# Patient Record
Sex: MALE | Race: WHITE | NOT HISPANIC OR LATINO | Employment: OTHER
[De-identification: names, ages, dates, MRNs, and addresses within clinical notes are randomized per-mention and may not be internally consistent; named-entity substitution may affect disease eponyms.]

---

## 2017-01-01 ENCOUNTER — HOSPITAL (OUTPATIENT)
Dept: OTHER | Age: 82
End: 2017-01-01

## 2017-02-01 ENCOUNTER — HOSPITAL (OUTPATIENT)
Dept: OTHER | Age: 82
End: 2017-02-01

## 2017-02-13 ENCOUNTER — PRIOR ORIGINAL RECORDS (OUTPATIENT)
Dept: OTHER | Age: 82
End: 2017-02-13

## 2017-03-01 ENCOUNTER — HOSPITAL (OUTPATIENT)
Dept: OTHER | Age: 82
End: 2017-03-01

## 2017-03-21 PROBLEM — N40.0 BENIGN NON-NODULAR PROSTATIC HYPERPLASIA WITHOUT LOWER URINARY TRACT SYMPTOMS: Status: ACTIVE | Noted: 2017-03-21

## 2017-03-21 PROBLEM — L03.115 CELLULITIS OF LEG, RIGHT: Status: ACTIVE | Noted: 2017-03-21

## 2017-05-18 ENCOUNTER — OFFICE VISIT (OUTPATIENT)
Dept: HEMATOLOGY/ONCOLOGY | Facility: HOSPITAL | Age: 82
End: 2017-05-18
Attending: INTERNAL MEDICINE
Payer: MEDICARE

## 2017-05-18 VITALS
TEMPERATURE: 97 F | DIASTOLIC BLOOD PRESSURE: 82 MMHG | OXYGEN SATURATION: 95 % | SYSTOLIC BLOOD PRESSURE: 139 MMHG | HEART RATE: 95 BPM | HEIGHT: 69.02 IN | RESPIRATION RATE: 20 BRPM | BODY MASS INDEX: 21.86 KG/M2 | WEIGHT: 147.63 LBS

## 2017-05-18 DIAGNOSIS — C15.5 MALIGNANT NEOPLASM OF LOWER THIRD OF ESOPHAGUS (HCC): Primary | ICD-10-CM

## 2017-05-18 PROCEDURE — 99213 OFFICE O/P EST LOW 20 MIN: CPT | Performed by: INTERNAL MEDICINE

## 2017-05-18 NOTE — PROGRESS NOTES
Pt here for follow up. Pt remains active. Pt had stomach pain and constipation over the weekend. Pt states he is better now.

## 2017-05-18 NOTE — PROGRESS NOTES
Cancer Center Progress Note    Problem List:      Patient Active Problem List:     Esophageal cancer ([facility] [address] [address]     S/P CABG (coronary artery bypass graft)     A-fib (HCC)     CAD (coronary artery disease)     Acute respiratory failure (HCC)     VT (ventricular periaortic lymph nodes that are not pathologically enlarged. .  BOWEL/MESENTERY:  There is a soft tissue nodule measuring 24 x 19 x 25 mm that is not significantly changed in overall size since the study of 9/20/13.  The stability over time suggests a benign Rfl: 0   aspirin 81 MG Oral Tab Take 81 mg by mouth daily. Disp:  Rfl:    Calcium Carbonate-Vitamin D 600-400 MG-UNIT Oral Tab Take 1 tablet by mouth daily. Disp:  Rfl:    Atorvastatin Calcium (LIPITOR) 10 MG Oral Tab Take 10 mg by mouth nightly.  Disp:  R

## 2017-05-22 ENCOUNTER — APPOINTMENT (OUTPATIENT)
Dept: HEMATOLOGY/ONCOLOGY | Facility: HOSPITAL | Age: 82
End: 2017-05-22
Attending: INTERNAL MEDICINE
Payer: MEDICARE

## 2017-06-01 ENCOUNTER — HOSPITAL (OUTPATIENT)
Dept: OTHER | Age: 82
End: 2017-06-01

## 2017-09-11 ENCOUNTER — MYAURORA ACCOUNT LINK (OUTPATIENT)
Dept: OTHER | Age: 82
End: 2017-09-11

## 2017-09-11 ENCOUNTER — PRIOR ORIGINAL RECORDS (OUTPATIENT)
Dept: OTHER | Age: 82
End: 2017-09-11

## 2018-01-17 ENCOUNTER — PRIOR ORIGINAL RECORDS (OUTPATIENT)
Dept: OTHER | Age: 83
End: 2018-01-17

## 2018-01-19 ENCOUNTER — HOSPITAL (OUTPATIENT)
Dept: OTHER | Age: 83
End: 2018-01-19
Attending: INTERNAL MEDICINE

## 2018-01-22 ENCOUNTER — DIAGNOSTIC TRANS (OUTPATIENT)
Dept: OTHER | Age: 83
End: 2018-01-22

## 2018-02-05 ENCOUNTER — HOSPITAL (OUTPATIENT)
Dept: OTHER | Age: 83
End: 2018-02-05

## 2018-03-01 ENCOUNTER — HOSPITAL (OUTPATIENT)
Dept: OTHER | Age: 83
End: 2018-03-01

## 2018-03-14 ENCOUNTER — PRIOR ORIGINAL RECORDS (OUTPATIENT)
Dept: OTHER | Age: 83
End: 2018-03-14

## 2018-03-20 LAB
CHOLESTEROL, TOTAL: 106 MG/DL
HDL CHOLESTEROL: 67 MG/DL
HEMATOCRIT: 42.6 %
HEMOGLOBIN: 14 G/DL
LDL CHOLESTEROL: 30 MG/DL
PLATELETS: 173 K/UL
RED BLOOD COUNT: 4.31 X 10-6/U
THYROID STIMULATING HORMONE: 1.15 MLU/L
THYROID STIMULATING HORMONE: 1.68 MLU/L
TRIGLYCERIDES: 47 MG/DL
VITAMIN D 25-OH: 27.86 NG/ML
VITAMIN D 25-OH: 27.86 NG/ML
WHITE BLOOD COUNT: 3.2 X 10-3/U

## 2018-04-01 ENCOUNTER — HOSPITAL (OUTPATIENT)
Dept: OTHER | Age: 83
End: 2018-04-01

## 2018-05-02 ENCOUNTER — OFFICE VISIT (OUTPATIENT)
Dept: HEMATOLOGY/ONCOLOGY | Facility: HOSPITAL | Age: 83
End: 2018-05-02
Attending: INTERNAL MEDICINE
Payer: MEDICARE

## 2018-05-02 VITALS
HEIGHT: 68 IN | HEART RATE: 79 BPM | RESPIRATION RATE: 18 BRPM | BODY MASS INDEX: 22.43 KG/M2 | SYSTOLIC BLOOD PRESSURE: 125 MMHG | DIASTOLIC BLOOD PRESSURE: 70 MMHG | TEMPERATURE: 98 F | WEIGHT: 148 LBS | OXYGEN SATURATION: 99 %

## 2018-05-02 DIAGNOSIS — C15.5 MALIGNANT NEOPLASM OF LOWER THIRD OF ESOPHAGUS (HCC): Primary | ICD-10-CM

## 2018-05-02 PROCEDURE — 99213 OFFICE O/P EST LOW 20 MIN: CPT | Performed by: INTERNAL MEDICINE

## 2018-05-02 NOTE — PROGRESS NOTES
Cancer Center Progress Note    Problem List:      Patient Active Problem List:     Esophageal cancer ([facility] [address]     S/P CABG (coronary artery bypass graft)     A-fib (HCC)     CAD (coronary artery disease)     Acute respiratory failure (HCC)     VT (ventricular nodes that are not pathologically enlarged. .  BOWEL/MESENTERY:  There is a soft tissue nodule measuring 24 x 19 x 25 mm that is not significantly changed in overall size since the study of 9/20/13.  The stability over time suggests a benign etiology such as tablet (5 mg total) by mouth daily. Disp: 90 tablet Rfl: 4   aspirin 81 MG Oral Tab Take 81 mg by mouth daily. Disp:  Rfl:    Calcium Carbonate-Vitamin D 600-400 MG-UNIT Oral Tab Take 1 tablet by mouth daily.  Disp:  Rfl:    Atorvastatin Calcium (LIPITOR) 1 gastroesophageal junction:      He had neoadjuvant radiation and chemotherapy followed by esophagectomy on 4/30/2012. He had residual disease with 4 positive nodes. The CT scan in 11/2016 showed no evidence of distant metastatic disease.     He had both u

## 2018-05-02 NOTE — PROGRESS NOTES
Patient here for yearly follow up. Patient states he feels great, working out, playing tennis, appetite is good, and sleeping well. Patient states he passed the 6 year giancarlo with no cancer reoccurrence.      Education Record    Learner:  Patient and Spouse

## 2018-05-31 ENCOUNTER — MYAURORA ACCOUNT LINK (OUTPATIENT)
Dept: OTHER | Age: 83
End: 2018-05-31

## 2018-09-10 ENCOUNTER — HOSPITAL (OUTPATIENT)
Dept: OTHER | Age: 83
End: 2018-09-10

## 2018-09-11 ENCOUNTER — PRIOR ORIGINAL RECORDS (OUTPATIENT)
Dept: OTHER | Age: 83
End: 2018-09-11

## 2018-09-26 ENCOUNTER — MYAURORA ACCOUNT LINK (OUTPATIENT)
Dept: OTHER | Age: 83
End: 2018-09-26

## 2018-09-26 ENCOUNTER — PRIOR ORIGINAL RECORDS (OUTPATIENT)
Dept: OTHER | Age: 83
End: 2018-09-26

## 2018-10-01 ENCOUNTER — HOSPITAL (OUTPATIENT)
Dept: OTHER | Age: 83
End: 2018-10-01

## 2018-10-03 ENCOUNTER — OFFICE VISIT (OUTPATIENT)
Dept: HEMATOLOGY/ONCOLOGY | Facility: HOSPITAL | Age: 83
End: 2018-10-03
Attending: INTERNAL MEDICINE
Payer: MEDICARE

## 2018-10-03 VITALS
HEART RATE: 86 BPM | WEIGHT: 148.19 LBS | SYSTOLIC BLOOD PRESSURE: 131 MMHG | OXYGEN SATURATION: 96 % | BODY MASS INDEX: 22.46 KG/M2 | TEMPERATURE: 96 F | HEIGHT: 67.99 IN | DIASTOLIC BLOOD PRESSURE: 84 MMHG | RESPIRATION RATE: 18 BRPM

## 2018-10-03 DIAGNOSIS — C15.5 MALIGNANT NEOPLASM OF LOWER THIRD OF ESOPHAGUS (HCC): Primary | ICD-10-CM

## 2018-10-03 LAB
ALBUMIN: 4.1 G/DL
ALKALINE PHOSPHATATE(ALK PHOS): 60 IU/L
BILIRUBIN TOTAL: 0.8 MG/DL
BUN: 18 MG/DL
CALCIUM: 9 MG/DL
CHLORIDE: 101 MEQ/L
CHOLESTEROL, TOTAL: 110 MG/DL
CREATININE, SERUM: 0.8 MG/DL
GLUCOSE: 104 MG/DL
HDL CHOLESTEROL: 67 MG/DL
HEMATOCRIT: 42.3 %
HEMOGLOBIN: 14.1 G/DL
LDL CHOLESTEROL: 33 MG/DL
PLATELETS: 164 K/UL
POTASSIUM, SERUM: 4.7 MEQ/L
PROTEIN, TOTAL: 6.6 G/DL
RED BLOOD COUNT: 4.27 X 10-6/U
SGOT (AST): 41 IU/L
SGPT (ALT): 40 IU/L
SODIUM: 139 MEQ/L
TRIGLYCERIDES: 49 MG/DL
VITAMIN D 25-OH: 27.86 NG/ML
WHITE BLOOD COUNT: 4.5 X 10-3/U

## 2018-10-03 PROCEDURE — 99213 OFFICE O/P EST LOW 20 MIN: CPT | Performed by: INTERNAL MEDICINE

## 2018-10-03 NOTE — PROGRESS NOTES
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 ADRENALS:  Normal.  AORTA/VASCULAR:  Mild atherosclerotic disease of the abdominal aorta and iliac vessels. [facility]   RETROPERITONEUM:  Small periaortic lymph nodes that are not pathologically enlarge Allergies    Medications:    FA-Pyridoxine-Cyanocobalamin (FOLBIC OR) Take by mouth daily. Disp:  Rfl:    FERROUS SULFATE OR once a week. Disp:  Rfl:    Cholecalciferol (VITAMIN D3) 2000 units Oral Tab Take by mouth daily.  Disp:  Rfl:    finasteride 5 MG O 05/02/2018    CA 9.3 05/02/2018    ALKPHO 67 05/02/2018    ALT 56 05/02/2018    AST 47 (H) 05/02/2018    BILT 0.9 05/02/2018    ALB 3.6 05/02/2018    TP 7.3 05/02/2018        Impression:     Adenocarcinoma of the gastroesophageal junction:      He had neoa

## 2018-10-06 NOTE — PROGRESS NOTES
Jameson Artist, Dr. Yasmany Cotton would like Mr. Nicole Goss to have a repeat EGD at this time. Please schedule this with either Dr. Ula Litten or Dr. Adam Chang at [facility].  Thank you. Britany - as above.   I want to be sure that this is scheduled as per Dr. Neil Asif recommend

## 2018-10-09 NOTE — PROGRESS NOTES
Dr. Ramila Benito- Pt actually has a f/u appt with Rachelle 10/22/18 to discuss scheduling his EGD. Pt is 80 and has not been seen since 2015. He has yet to schedule his CT that Dr. Jaki Bear has ordered.  Should we keep appt or c/x appt and just schedule the EGD at [facility]?

## 2018-10-11 ENCOUNTER — HOSPITAL ENCOUNTER (OUTPATIENT)
Dept: CT IMAGING | Facility: HOSPITAL | Age: 83
Discharge: HOME | End: 2018-10-11
Attending: INTERNAL MEDICINE
Payer: MEDICARE

## 2018-10-11 DIAGNOSIS — C15.5 MALIGNANT NEOPLASM OF LOWER THIRD OF ESOPHAGUS (HCC): ICD-10-CM

## 2018-10-11 PROCEDURE — 82565 ASSAY OF CREATININE: CPT

## 2018-10-11 PROCEDURE — 71260 CT THORAX DX C+: CPT | Performed by: INTERNAL MEDICINE

## 2018-10-19 ENCOUNTER — TELEPHONE (OUTPATIENT)
Dept: HEMATOLOGY/ONCOLOGY | Facility: HOSPITAL | Age: 83
End: 2018-10-19

## 2018-10-19 NOTE — TELEPHONE ENCOUNTER
Pt calling for CT scan results. Already had been available in [address] but pt cannot log on. He will call to set up new password. Reviewed results- no met disease.    Sent copy of scan to Dr. Kam Boudreaux at [phone] (cardiologist) per pt request.

## 2018-10-24 ENCOUNTER — MED REC SCAN ONLY (OUTPATIENT)
Dept: HEMATOLOGY/ONCOLOGY | Facility: HOSPITAL | Age: 83
End: 2018-10-24

## 2018-11-01 ENCOUNTER — HOSPITAL (OUTPATIENT)
Dept: OTHER | Age: 83
End: 2018-11-01

## 2018-12-06 RX ORDER — THIAMINE HCL 100 MG
1 TABLET ORAL 2 TIMES DAILY
COMMUNITY
End: 2019-05-08

## 2018-12-11 ENCOUNTER — HOSPITAL ENCOUNTER (OUTPATIENT)
Facility: HOSPITAL | Age: 83
Setting detail: HOSPITAL OUTPATIENT SURGERY
Discharge: HOME | End: 2018-12-11
Attending: INTERNAL MEDICINE | Admitting: INTERNAL MEDICINE
Payer: MEDICARE

## 2018-12-11 ENCOUNTER — ANESTHESIA EVENT (OUTPATIENT)
Dept: ENDOSCOPY | Facility: HOSPITAL | Age: 83
End: 2018-12-11
Payer: MEDICARE

## 2018-12-11 ENCOUNTER — ANESTHESIA (OUTPATIENT)
Dept: ENDOSCOPY | Facility: HOSPITAL | Age: 83
End: 2018-12-11
Payer: MEDICARE

## 2018-12-11 VITALS
HEIGHT: 67.75 IN | TEMPERATURE: 99 F | DIASTOLIC BLOOD PRESSURE: 87 MMHG | WEIGHT: 148 LBS | SYSTOLIC BLOOD PRESSURE: 123 MMHG | RESPIRATION RATE: 18 BRPM | BODY MASS INDEX: 22.69 KG/M2 | HEART RATE: 74 BPM | OXYGEN SATURATION: 100 %

## 2018-12-11 DIAGNOSIS — Z85.01 HISTORY OF ESOPHAGEAL CANCER: ICD-10-CM

## 2018-12-11 DIAGNOSIS — R13.10 DYSPHAGIA, UNSPECIFIED TYPE: ICD-10-CM

## 2018-12-11 DIAGNOSIS — R05.9 COUGH: ICD-10-CM

## 2018-12-11 PROCEDURE — 88305 TISSUE EXAM BY PATHOLOGIST: CPT | Performed by: INTERNAL MEDICINE

## 2018-12-11 PROCEDURE — 0DB98ZX EXCISION OF DUODENUM, VIA NATURAL OR ARTIFICIAL OPENING ENDOSCOPIC, DIAGNOSTIC: ICD-10-PCS | Performed by: INTERNAL MEDICINE

## 2018-12-11 PROCEDURE — 0DB58ZX EXCISION OF ESOPHAGUS, VIA NATURAL OR ARTIFICIAL OPENING ENDOSCOPIC, DIAGNOSTIC: ICD-10-PCS | Performed by: INTERNAL MEDICINE

## 2018-12-11 PROCEDURE — 0DB78ZX EXCISION OF STOMACH, PYLORUS, VIA NATURAL OR ARTIFICIAL OPENING ENDOSCOPIC, DIAGNOSTIC: ICD-10-PCS | Performed by: INTERNAL MEDICINE

## 2018-12-11 PROCEDURE — 88342 IMHCHEM/IMCYTCHM 1ST ANTB: CPT | Performed by: INTERNAL MEDICINE

## 2018-12-11 PROCEDURE — 88312 SPECIAL STAINS GROUP 1: CPT | Performed by: INTERNAL MEDICINE

## 2018-12-11 RX ORDER — SODIUM CHLORIDE, SODIUM LACTATE, POTASSIUM CHLORIDE, CALCIUM CHLORIDE 600; 310; 30; 20 MG/100ML; MG/100ML; MG/100ML; MG/100ML
INJECTION, SOLUTION INTRAVENOUS CONTINUOUS
Status: DISCONTINUED | OUTPATIENT
Start: 2018-12-11 | End: 2018-12-11

## 2018-12-11 NOTE — ANESTHESIA POSTPROCEDURE EVALUATION
[address] Balderrama Patient Status:  Hospital Outpatient Surgery   Age/Gender 80year old male MRN [id number]   Location [address] [place] [address]. Attending Nasir Jackson, [address] [facility] Day # 0 PCP Paige Patient, DO       Anesthesia Post-op

## 2018-12-11 NOTE — ANESTHESIA PREPROCEDURE EVALUATION
PRE-OP EVALUATION    Patient Name: Sen Clemente    Pre-op Diagnosis: History of esophageal cancer [Z85.01]  Cough [R05]  Dysphagia, unspecified type [R13.10]    Procedure(s):  ESOPHAGOGASTRODUODENOSCOPY (EGD) with biopsies    Surgeon(s) and Role:     * Medtronic dual chamber, not pacemaker dependent   • CARDIAC PACEMAKER PLACEMENT      Medtronic dual chamber, not pacemaker dependent   • CATARACT Bilateral 2016    IOL's   • COLONOSCOPY N/A 12/9/2014    Performed by Judy Perales MD at [facility] ENDOSCOPY   • ES

## 2018-12-11 NOTE — H&P
[address]   11-7 Patient Status:  Hospital Outpatient Surgery    1/10/1934 MRN [id number]   [facility] ENDOSCOPY Attending Phi Kurtz MD   Hosp Day # 0 PCP [facility], DO     CC: esophageal canc Performed by Fanta Guillen MD at [address]   • HEMORRHOIDECTOMY,INT/EXT,SIMPLE     • ORTHOPEDIC SURG (PBP) Left     hammer toe   • OTHER      surgical resection-esophageus (2012),L leg-varicose vein surgery (2006)   • OTHER      an abnormality     Numbness     Neuropathy     Benign non-nodular prostatic hyperplasia without lower urinary tract symptoms     Cellulitis of leg, right    esophageal cancer w/p esopahgectomy and gastric pull through (4/2012) who we are seeing at the request

## 2018-12-11 NOTE — OPERATIVE REPORT
Laron Guthrie Patient Status:  Hospital Outpatient Surgery    1/10/1934 MRN [id number]   [facility] END[facility] Attending Severo Posey, MD   Hosp Day # 0 PCP Kady Brown DO     PREOPERATIVE DIAGNOSIS/INDICATION: Post esophagectom

## 2018-12-13 ENCOUNTER — MYAURORA ACCOUNT LINK (OUTPATIENT)
Dept: OTHER | Age: 83
End: 2018-12-13

## 2018-12-16 NOTE — PROGRESS NOTES
Date: 2018    To: Jc Corrales  : 1/10/1934    I hope this letter finds you doing well. I am writing to inform you of the following:      The biopsies obtained at the time of your recent endoscopic procedure were benign and showed no evidence o

## 2019-01-02 ENCOUNTER — HOSPITAL (OUTPATIENT)
Dept: OTHER | Age: 84
End: 2019-01-02

## 2019-02-27 ENCOUNTER — PRIOR ORIGINAL RECORDS (OUTPATIENT)
Dept: OTHER | Age: 84
End: 2019-02-27

## 2019-02-28 VITALS
HEIGHT: 69 IN | BODY MASS INDEX: 22.22 KG/M2 | HEART RATE: 78 BPM | DIASTOLIC BLOOD PRESSURE: 64 MMHG | WEIGHT: 150 LBS | SYSTOLIC BLOOD PRESSURE: 112 MMHG

## 2019-02-28 VITALS
HEART RATE: 70 BPM | HEIGHT: 69 IN | DIASTOLIC BLOOD PRESSURE: 68 MMHG | BODY MASS INDEX: 21.92 KG/M2 | WEIGHT: 148 LBS | SYSTOLIC BLOOD PRESSURE: 116 MMHG

## 2019-02-28 VITALS
DIASTOLIC BLOOD PRESSURE: 60 MMHG | SYSTOLIC BLOOD PRESSURE: 114 MMHG | BODY MASS INDEX: 21.62 KG/M2 | HEART RATE: 60 BPM | HEIGHT: 69 IN | WEIGHT: 146 LBS

## 2019-03-01 VITALS
BODY MASS INDEX: 22.36 KG/M2 | HEART RATE: 70 BPM | WEIGHT: 151 LBS | DIASTOLIC BLOOD PRESSURE: 72 MMHG | SYSTOLIC BLOOD PRESSURE: 118 MMHG | HEIGHT: 69 IN

## 2019-03-17 RX ORDER — METOPROLOL SUCCINATE 25 MG/1
25 TABLET, EXTENDED RELEASE ORAL DAILY
COMMUNITY
Start: 2012-07-31

## 2019-03-17 RX ORDER — CALCIUM CARBONATE 500(1250)
TABLET ORAL
COMMUNITY
Start: 2017-02-13

## 2019-03-17 RX ORDER — FERROUS SULFATE 325(65) MG
325 TABLET ORAL
COMMUNITY
Start: 2017-02-13

## 2019-03-17 RX ORDER — FINASTERIDE 5 MG/1
5 TABLET, FILM COATED ORAL DAILY
COMMUNITY

## 2019-03-17 RX ORDER — CHOLECALCIFEROL (VITAMIN D3) 125 MCG
50 CAPSULE ORAL DAILY
COMMUNITY

## 2019-04-10 ENCOUNTER — APPOINTMENT (OUTPATIENT)
Dept: CARDIOLOGY | Age: 84
End: 2019-04-10
Attending: INTERNAL MEDICINE

## 2019-04-11 ENCOUNTER — TELEPHONE (OUTPATIENT)
Dept: CARDIOLOGY | Age: 84
End: 2019-04-11

## 2019-04-22 ENCOUNTER — ANCILLARY PROCEDURE (OUTPATIENT)
Dept: CARDIOLOGY | Age: 84
End: 2019-04-22
Attending: INTERNAL MEDICINE

## 2019-04-22 DIAGNOSIS — Z95.810 ICD (IMPLANTABLE CARDIOVERTER-DEFIBRILLATOR) IN PLACE: ICD-10-CM

## 2019-04-23 ENCOUNTER — TELEPHONE (OUTPATIENT)
Dept: CARDIOLOGY | Age: 84
End: 2019-04-23

## 2019-05-01 ENCOUNTER — OFFICE VISIT (OUTPATIENT)
Dept: CARDIOLOGY | Age: 84
End: 2019-05-01

## 2019-05-01 ENCOUNTER — TELEPHONE (OUTPATIENT)
Dept: CARDIOLOGY | Age: 84
End: 2019-05-01

## 2019-05-01 ENCOUNTER — ANCILLARY PROCEDURE (OUTPATIENT)
Dept: CARDIOLOGY | Age: 84
End: 2019-05-01
Attending: INTERNAL MEDICINE

## 2019-05-01 VITALS
DIASTOLIC BLOOD PRESSURE: 54 MMHG | HEART RATE: 78 BPM | HEIGHT: 69 IN | WEIGHT: 148 LBS | SYSTOLIC BLOOD PRESSURE: 100 MMHG | BODY MASS INDEX: 21.92 KG/M2

## 2019-05-01 DIAGNOSIS — Z45.018 PACEMAKER REPROGRAMMING/CHECK: ICD-10-CM

## 2019-05-01 DIAGNOSIS — I10 ESSENTIAL HYPERTENSION: Primary | ICD-10-CM

## 2019-05-01 PROCEDURE — 93283 PRGRMG EVAL IMPLANTABLE DFB: CPT | Performed by: INTERNAL MEDICINE

## 2019-05-01 PROCEDURE — 99214 OFFICE O/P EST MOD 30 MIN: CPT | Performed by: INTERNAL MEDICINE

## 2019-05-01 PROCEDURE — 93290 INTERROG DEV EVAL ICPMS IP: CPT | Performed by: INTERNAL MEDICINE

## 2019-05-08 ENCOUNTER — OFFICE VISIT (OUTPATIENT)
Dept: HEMATOLOGY/ONCOLOGY | Facility: HOSPITAL | Age: 84
End: 2019-05-08
Attending: INTERNAL MEDICINE
Payer: MEDICARE

## 2019-05-08 VITALS
SYSTOLIC BLOOD PRESSURE: 112 MMHG | RESPIRATION RATE: 20 BRPM | HEART RATE: 88 BPM | OXYGEN SATURATION: 97 % | HEIGHT: 67.76 IN | BODY MASS INDEX: 22.54 KG/M2 | WEIGHT: 147 LBS | DIASTOLIC BLOOD PRESSURE: 76 MMHG | TEMPERATURE: 96 F

## 2019-05-08 DIAGNOSIS — C15.5 MALIGNANT NEOPLASM OF LOWER THIRD OF ESOPHAGUS (HCC): Primary | ICD-10-CM

## 2019-05-08 DIAGNOSIS — R42 DISEQUILIBRIUM: ICD-10-CM

## 2019-05-08 PROCEDURE — 99213 OFFICE O/P EST LOW 20 MIN: CPT | Performed by: INTERNAL MEDICINE

## 2019-05-08 RX ORDER — ATORVASTATIN CALCIUM 20 MG/1
20 TABLET, FILM COATED ORAL NIGHTLY
COMMUNITY

## 2019-05-08 NOTE — PROGRESS NOTES
Cancer Center Progress Note    Problem List:      Patient Active Problem List:     Esophageal cancer ([facility] [address]     S/P CABG (coronary artery bypass graft)     A-fib (HCC)     CAD (coronary artery disease)     Acute respiratory failure (HCC)     VT (ventricular Rfl:    FINASTERIDE 5 MG Oral Tab TAKE ONE TABLET BY MOUTH ONCE DAILY Disp: 90 tablet Rfl: 0   FA-Pyridoxine-Cyanocobalamin (FOLBIC OR) Take by mouth daily. Disp:  Rfl:    FERROUS SULFATE OR Take 325 mg by mouth.  2-3 times per week  Disp:  Rfl:    Cholecal  (H) 05/02/2018    CA 9.3 05/02/2018    ALKPHO 67 05/02/2018    ALT 56 05/02/2018    AST 47 (H) 05/02/2018    BILT 0.9 05/02/2018    ALB 3.6 05/02/2018    TP 7.3 05/02/2018      Radiologic imaging reviewed at today's visit:    CT of Chest on 10/11/2 by esophagectomy on 4/30/2012. He had residual disease with 4 positive nodes. The CT scan in 11/2016 showed no evidence of distant metastatic disease. He had a CT of the chest and an EGD since last visit. He had no evidence of recurrent disease.  He is n

## 2019-05-24 PROCEDURE — 81003 URINALYSIS AUTO W/O SCOPE: CPT | Performed by: UROLOGY

## 2019-10-29 ENCOUNTER — ANCILLARY PROCEDURE (OUTPATIENT)
Dept: CARDIOLOGY | Age: 84
End: 2019-10-29
Attending: INTERNAL MEDICINE

## 2019-10-29 DIAGNOSIS — Z95.810 ICD (IMPLANTABLE CARDIOVERTER-DEFIBRILLATOR) IN PLACE: ICD-10-CM

## 2019-11-18 ENCOUNTER — OFFICE VISIT (OUTPATIENT)
Dept: CARDIOLOGY | Age: 84
End: 2019-11-18

## 2019-11-18 VITALS
HEIGHT: 68 IN | SYSTOLIC BLOOD PRESSURE: 104 MMHG | BODY MASS INDEX: 21.82 KG/M2 | WEIGHT: 144 LBS | HEART RATE: 74 BPM | DIASTOLIC BLOOD PRESSURE: 62 MMHG

## 2019-11-18 DIAGNOSIS — Z95.810 ICD (IMPLANTABLE CARDIOVERTER-DEFIBRILLATOR) IN PLACE: ICD-10-CM

## 2019-11-18 DIAGNOSIS — I10 ESSENTIAL HYPERTENSION: Primary | ICD-10-CM

## 2019-11-18 PROCEDURE — 99213 OFFICE O/P EST LOW 20 MIN: CPT | Performed by: INTERNAL MEDICINE

## 2019-11-18 RX ORDER — ATORVASTATIN CALCIUM 10 MG/1
10 TABLET, FILM COATED ORAL
Refills: 1 | COMMUNITY
Start: 2019-11-17

## 2019-11-18 RX ORDER — FINASTERIDE 5 MG/1
5 TABLET, FILM COATED ORAL
COMMUNITY
Start: 2019-05-24 | End: 2020-05-23

## 2019-11-18 RX ORDER — OMEPRAZOLE 40 MG/1
40 CAPSULE, DELAYED RELEASE ORAL DAILY
Refills: 1 | COMMUNITY
Start: 2019-10-04

## 2019-11-18 ASSESSMENT — PATIENT HEALTH QUESTIONNAIRE - PHQ9
2. FEELING DOWN, DEPRESSED OR HOPELESS: NOT AT ALL
1. LITTLE INTEREST OR PLEASURE IN DOING THINGS: NOT AT ALL
SUM OF ALL RESPONSES TO PHQ9 QUESTIONS 1 AND 2: 0
SUM OF ALL RESPONSES TO PHQ9 QUESTIONS 1 AND 2: 0

## 2020-01-30 ENCOUNTER — ANCILLARY ORDERS (OUTPATIENT)
Dept: CARDIOLOGY | Age: 85
End: 2020-01-30

## 2020-01-30 ENCOUNTER — ANCILLARY PROCEDURE (OUTPATIENT)
Dept: CARDIOLOGY | Age: 85
End: 2020-01-30
Attending: INTERNAL MEDICINE

## 2020-01-30 DIAGNOSIS — Z95.810 ICD (IMPLANTABLE CARDIOVERTER-DEFIBRILLATOR) IN PLACE: ICD-10-CM

## 2020-01-30 PROCEDURE — X1114 CARDIAC DEVICE HOME CHECK - REMOTE UNSCHEDULED: HCPCS | Performed by: INTERNAL MEDICINE

## 2020-04-22 ENCOUNTER — ANCILLARY PROCEDURE (OUTPATIENT)
Dept: CARDIOLOGY | Age: 85
End: 2020-04-22
Attending: INTERNAL MEDICINE

## 2020-04-22 ENCOUNTER — ANCILLARY ORDERS (OUTPATIENT)
Dept: CARDIOLOGY | Age: 85
End: 2020-04-22

## 2020-04-22 DIAGNOSIS — Z95.810 ICD (IMPLANTABLE CARDIOVERTER-DEFIBRILLATOR) IN PLACE: ICD-10-CM

## 2020-04-22 PROCEDURE — 93295 DEV INTERROG REMOTE 1/2/MLT: CPT | Performed by: INTERNAL MEDICINE

## 2020-04-22 PROCEDURE — X1114 CARDIAC DEVICE HOME CHECK - REMOTE UNSCHEDULED: HCPCS | Performed by: INTERNAL MEDICINE

## 2020-05-06 ENCOUNTER — APPOINTMENT (OUTPATIENT)
Dept: CARDIOLOGY | Age: 85
End: 2020-05-06
Attending: INTERNAL MEDICINE

## 2020-05-06 ENCOUNTER — OFFICE VISIT (OUTPATIENT)
Dept: CARDIOLOGY | Age: 85
End: 2020-05-06

## 2020-05-06 VITALS
DIASTOLIC BLOOD PRESSURE: 62 MMHG | OXYGEN SATURATION: 97 % | BODY MASS INDEX: 21.9 KG/M2 | RESPIRATION RATE: 18 BRPM | HEIGHT: 68 IN | HEART RATE: 76 BPM | SYSTOLIC BLOOD PRESSURE: 114 MMHG

## 2020-05-06 DIAGNOSIS — I10 ESSENTIAL HYPERTENSION: Primary | ICD-10-CM

## 2020-05-06 DIAGNOSIS — Z95.810 ICD (IMPLANTABLE CARDIOVERTER-DEFIBRILLATOR) IN PLACE: ICD-10-CM

## 2020-05-06 PROCEDURE — 99442 TELEPHONE E&M BY PHYSICIAN EST PT NOT ORIG PREV 7 DAYS 11-20 MIN: CPT | Performed by: INTERNAL MEDICINE

## 2020-07-30 ENCOUNTER — ANCILLARY PROCEDURE (OUTPATIENT)
Dept: CARDIOLOGY | Age: 85
End: 2020-07-30
Attending: INTERNAL MEDICINE

## 2020-07-30 ENCOUNTER — ANCILLARY ORDERS (OUTPATIENT)
Dept: CARDIOLOGY | Age: 85
End: 2020-07-30

## 2020-07-30 DIAGNOSIS — Z45.02 ENCOUNTER FOR ASSESSMENT OF IMPLANTABLE CARDIOVERTER-DEFIBRILLATOR (ICD): ICD-10-CM

## 2020-07-30 PROCEDURE — 93295 DEV INTERROG REMOTE 1/2/MLT: CPT | Performed by: INTERNAL MEDICINE

## 2020-07-30 PROCEDURE — X1114 CARDIAC DEVICE HOME CHECK - REMOTE UNSCHEDULED: HCPCS | Performed by: INTERNAL MEDICINE

## 2020-10-05 ENCOUNTER — ANCILLARY PROCEDURE (OUTPATIENT)
Dept: CARDIOLOGY | Age: 85
End: 2020-10-05
Attending: INTERNAL MEDICINE

## 2020-10-05 VITALS
HEART RATE: 86 BPM | WEIGHT: 145 LBS | SYSTOLIC BLOOD PRESSURE: 104 MMHG | DIASTOLIC BLOOD PRESSURE: 68 MMHG | BODY MASS INDEX: 22.05 KG/M2

## 2020-10-05 DIAGNOSIS — Z95.810 ICD (IMPLANTABLE CARDIOVERTER-DEFIBRILLATOR) IN PLACE: Primary | ICD-10-CM

## 2020-10-05 DIAGNOSIS — I47.20 VENTRICULAR TACHYCARDIA (CMD): ICD-10-CM

## 2020-10-05 PROCEDURE — 93290 INTERROG DEV EVAL ICPMS IP: CPT | Performed by: INTERNAL MEDICINE

## 2020-10-05 PROCEDURE — 93283 PRGRMG EVAL IMPLANTABLE DFB: CPT | Performed by: INTERNAL MEDICINE

## 2020-10-14 ENCOUNTER — OFFICE VISIT (OUTPATIENT)
Dept: HEMATOLOGY/ONCOLOGY | Facility: HOSPITAL | Age: 85
End: 2020-10-14
Attending: INTERNAL MEDICINE
Payer: MEDICARE

## 2020-10-14 VITALS
HEART RATE: 75 BPM | OXYGEN SATURATION: 97 % | RESPIRATION RATE: 16 BRPM | BODY MASS INDEX: 23 KG/M2 | WEIGHT: 151 LBS | DIASTOLIC BLOOD PRESSURE: 82 MMHG | SYSTOLIC BLOOD PRESSURE: 119 MMHG

## 2020-10-14 DIAGNOSIS — R42 DISEQUILIBRIUM: ICD-10-CM

## 2020-10-14 DIAGNOSIS — C15.5 MALIGNANT NEOPLASM OF LOWER THIRD OF ESOPHAGUS (HCC): Primary | ICD-10-CM

## 2020-10-14 PROCEDURE — 99213 OFFICE O/P EST LOW 20 MIN: CPT | Performed by: INTERNAL MEDICINE

## 2020-10-14 NOTE — PROGRESS NOTES
Cancer Center Progress Note    Problem List:      Patient Active Problem List:     Esophageal cancer ([facility] [address]     S/P CABG (coronary artery bypass graft)     A-fib (HCC)     CAD (coronary artery disease)     Acute respiratory failure (HCC)     VT (ventricular 2    •  atorvastatin 20 MG Oral Tab, Take 20 mg by mouth nightly. 3 times per weeks, Disp: , Rfl:     •  FA-Pyridoxine-Cyanocobalamin (FOLBIC OR), Take by mouth daily. , Disp: , Rfl:     •  FERROUS SULFATE OR, Take 325 mg by mouth.  2-3 times per week , Disp (H) 05/02/2018    CA 9.3 05/02/2018    ALKPHO 67 05/02/2018    ALT 56 05/02/2018    AST 47 (H) 05/02/2018    BILT 0.9 05/02/2018    ALB 3.6 05/02/2018    TP 7.3 05/02/2018      Radiologic imaging reviewed at today's visit:    CT of Chest on 10/11/2018:  FI esophagectomy on 4/30/2012. He had residual disease with 4 positive nodes. The CT scan in 11/2016 showed no evidence of distant metastatic disease. He had a CT of the chest in 10/2018 and an EGD in 12/2018.  I discussed that if he has continued or incre

## 2020-11-11 ENCOUNTER — OFFICE VISIT (OUTPATIENT)
Dept: CARDIOLOGY | Age: 85
End: 2020-11-11

## 2020-11-11 VITALS
BODY MASS INDEX: 22.88 KG/M2 | WEIGHT: 151 LBS | HEART RATE: 76 BPM | SYSTOLIC BLOOD PRESSURE: 130 MMHG | HEIGHT: 68 IN | DIASTOLIC BLOOD PRESSURE: 60 MMHG

## 2020-11-11 DIAGNOSIS — Z95.810 ICD (IMPLANTABLE CARDIOVERTER-DEFIBRILLATOR) IN PLACE: ICD-10-CM

## 2020-11-11 DIAGNOSIS — I10 ESSENTIAL HYPERTENSION: Primary | ICD-10-CM

## 2020-11-11 PROCEDURE — 99213 OFFICE O/P EST LOW 20 MIN: CPT | Performed by: INTERNAL MEDICINE

## 2020-11-11 ASSESSMENT — PATIENT HEALTH QUESTIONNAIRE - PHQ9
SUM OF ALL RESPONSES TO PHQ9 QUESTIONS 1 AND 2: 0
1. LITTLE INTEREST OR PLEASURE IN DOING THINGS: NOT AT ALL
CLINICAL INTERPRETATION OF PHQ2 SCORE: NO FURTHER SCREENING NEEDED
2. FEELING DOWN, DEPRESSED OR HOPELESS: NOT AT ALL
CLINICAL INTERPRETATION OF PHQ9 SCORE: NO FURTHER SCREENING NEEDED
SUM OF ALL RESPONSES TO PHQ9 QUESTIONS 1 AND 2: 0

## 2021-02-11 ENCOUNTER — ANCILLARY PROCEDURE (OUTPATIENT)
Dept: CARDIOLOGY | Age: 86
End: 2021-02-11
Attending: INTERNAL MEDICINE

## 2021-02-11 DIAGNOSIS — Z95.810 ICD (IMPLANTABLE CARDIOVERTER-DEFIBRILLATOR) IN PLACE: ICD-10-CM

## 2021-02-11 PROCEDURE — 93295 DEV INTERROG REMOTE 1/2/MLT: CPT | Performed by: INTERNAL MEDICINE

## 2021-03-03 DIAGNOSIS — Z23 NEED FOR VACCINATION: ICD-10-CM

## 2021-04-14 ENCOUNTER — OFFICE VISIT (OUTPATIENT)
Dept: HEMATOLOGY/ONCOLOGY | Facility: HOSPITAL | Age: 86
End: 2021-04-14
Attending: INTERNAL MEDICINE
Payer: MEDICARE

## 2021-04-14 DIAGNOSIS — C15.5 MALIGNANT NEOPLASM OF LOWER THIRD OF ESOPHAGUS (HCC): ICD-10-CM

## 2021-04-14 NOTE — PROGRESS NOTES
Cancer Center Progress Note    Problem List:      Patient Active Problem List:     Esophageal cancer ([facility] [address]     S/P CABG (coronary artery bypass graft)     A-fib (HCC)     CAD (coronary artery disease)     Acute respiratory failure (HCC)     VT (ventricular D3) 2000 units Oral Tab, Take by mouth daily. , Disp: , Rfl:   aspirin 81 MG Oral Tab, Take 81 mg by mouth daily. , Disp: , Rfl:   Calcium Carbonate-Vitamin D 600-400 MG-UNIT Oral Tab, Take 1 tablet by mouth daily. , Disp: , Rfl:   omeprazole (PRILOSEC) 20 MG subpleural nodule within the right lower lobe, unchanged since 11/22/2016. VASCULATURE:  Unremarkable. THORACIC AORTA:  Scattered atherosclerosis. Ectatic mid ascending thoracic aorta measuring 3.9 cm.   MEDIASTINUM/CHRISSIE:  Postoperative changes within th

## 2021-04-14 NOTE — PROGRESS NOTES
Outpatient Oncology Care Plan  Problem list:  knowledge deficit    Problems related to:    combined modality therapy    Interventions:  provided general teaching    Expected outcomes:  understands plan of care    Progress towards outcome:  making progress

## 2021-06-03 ENCOUNTER — ANCILLARY PROCEDURE (OUTPATIENT)
Dept: CARDIOLOGY | Age: 86
End: 2021-06-03
Attending: INTERNAL MEDICINE

## 2021-06-03 ENCOUNTER — ANCILLARY ORDERS (OUTPATIENT)
Dept: CARDIOLOGY | Age: 86
End: 2021-06-03

## 2021-06-03 DIAGNOSIS — Z95.810 ICD (IMPLANTABLE CARDIOVERTER-DEFIBRILLATOR) IN PLACE: ICD-10-CM

## 2021-06-03 PROCEDURE — X1114 CARDIAC DEVICE HOME CHECK - REMOTE UNSCHEDULED: HCPCS | Performed by: INTERNAL MEDICINE

## 2021-06-03 PROCEDURE — 93295 DEV INTERROG REMOTE 1/2/MLT: CPT | Performed by: INTERNAL MEDICINE

## 2021-10-06 ENCOUNTER — TELEPHONE (OUTPATIENT)
Dept: CARDIOLOGY | Age: 86
End: 2021-10-06

## 2022-04-13 ENCOUNTER — APPOINTMENT (OUTPATIENT)
Dept: HEMATOLOGY/ONCOLOGY | Facility: HOSPITAL | Age: 87
End: 2022-04-13
Attending: INTERNAL MEDICINE
Payer: MEDICARE

## 2022-04-27 ENCOUNTER — OFFICE VISIT (OUTPATIENT)
Dept: HEMATOLOGY/ONCOLOGY | Facility: HOSPITAL | Age: 87
End: 2022-04-27
Attending: INTERNAL MEDICINE
Payer: MEDICARE

## 2022-04-27 VITALS
OXYGEN SATURATION: 99 % | BODY MASS INDEX: 22 KG/M2 | RESPIRATION RATE: 18 BRPM | HEART RATE: 91 BPM | WEIGHT: 150 LBS | SYSTOLIC BLOOD PRESSURE: 143 MMHG | TEMPERATURE: 98 F | DIASTOLIC BLOOD PRESSURE: 75 MMHG

## 2022-04-27 DIAGNOSIS — C15.5 MALIGNANT NEOPLASM OF LOWER THIRD OF ESOPHAGUS (HCC): Primary | ICD-10-CM

## 2022-04-27 PROCEDURE — 99212 OFFICE O/P EST SF 10 MIN: CPT | Performed by: INTERNAL MEDICINE

## 2022-04-27 RX ORDER — ATORVASTATIN CALCIUM 10 MG/1
10 TABLET, FILM COATED ORAL DAILY
COMMUNITY
Start: 2021-11-21

## 2022-04-27 NOTE — PROGRESS NOTES
Patient is here for follow up for esophageal cancer. He reports that he is feeling well. His appetite is good and he does not have trouble eating. He is trying to increase his activity now that winter is done. He says that his PSA has been elevated and he is following this with Dr. Tashia Caraballo. He will see him again in August.    He denies any fever or sweats. He denies any cough or shortness of breath.      Education Record    Learner:  Patient    Disease / Diagnosis: esophageal cancer    Barriers / Limitations:  None   Comments:    Method:  Discussion   Comments:    General Topics:  Side effects and symptom management   Comments:    Outcome:  Shows understanding   Comments:

## 2022-09-10 ENCOUNTER — LAB ENCOUNTER (OUTPATIENT)
Dept: LAB | Facility: HOSPITAL | Age: 87
End: 2022-09-10
Attending: UROLOGY
Payer: MEDICARE

## 2022-09-10 ENCOUNTER — LAB ENCOUNTER (OUTPATIENT)
Dept: LAB | Facility: HOSPITAL | Age: 87
End: 2022-09-10
Attending: INTERNAL MEDICINE
Payer: MEDICARE

## 2022-09-10 DIAGNOSIS — Z13.9 SCREENING PROCEDURE: ICD-10-CM

## 2022-09-10 DIAGNOSIS — R97.20 ELEVATED PSA, LESS THAN 10 NG/ML: ICD-10-CM

## 2022-09-10 DIAGNOSIS — Z01.812 PRE-OPERATIVE LABORATORY EXAMINATION: Primary | ICD-10-CM

## 2022-09-10 LAB
ANION GAP SERPL CALC-SCNC: 3 MMOL/L (ref 0–18)
BASOPHILS # BLD AUTO: 0.03 X10(3) UL (ref 0–0.2)
BASOPHILS NFR BLD AUTO: 0.5 %
BUN BLD-MCNC: 18 MG/DL (ref 7–18)
CALCIUM BLD-MCNC: 8.9 MG/DL (ref 8.5–10.1)
CHLORIDE SERPL-SCNC: 107 MMOL/L (ref 98–112)
CO2 SERPL-SCNC: 30 MMOL/L (ref 21–32)
CREAT BLD-MCNC: 0.88 MG/DL
EOSINOPHIL # BLD AUTO: 0.07 X10(3) UL (ref 0–0.7)
EOSINOPHIL NFR BLD AUTO: 1.2 %
ERYTHROCYTE [DISTWIDTH] IN BLOOD BY AUTOMATED COUNT: 13.4 %
FASTING STATUS PATIENT QL REPORTED: NO
GFR SERPLBLD BASED ON 1.73 SQ M-ARVRAT: 83 ML/MIN/1.73M2 (ref 60–?)
GLUCOSE BLD-MCNC: 78 MG/DL (ref 70–99)
HCT VFR BLD AUTO: 44.4 %
HGB BLD-MCNC: 14.4 G/DL
IMM GRANULOCYTES # BLD AUTO: 0.06 X10(3) UL (ref 0–1)
IMM GRANULOCYTES NFR BLD: 1 %
LYMPHOCYTES # BLD AUTO: 0.88 X10(3) UL (ref 1–4)
LYMPHOCYTES NFR BLD AUTO: 15.2 %
MCH RBC QN AUTO: 32.7 PG (ref 26–34)
MCHC RBC AUTO-ENTMCNC: 32.4 G/DL (ref 31–37)
MCV RBC AUTO: 100.9 FL
MONOCYTES # BLD AUTO: 0.8 X10(3) UL (ref 0.1–1)
MONOCYTES NFR BLD AUTO: 13.8 %
NEUTROPHILS # BLD AUTO: 3.94 X10 (3) UL (ref 1.5–7.7)
NEUTROPHILS # BLD AUTO: 3.94 X10(3) UL (ref 1.5–7.7)
NEUTROPHILS NFR BLD AUTO: 68.3 %
OSMOLALITY SERPL CALC.SUM OF ELEC: 291 MOSM/KG (ref 275–295)
PLATELET # BLD AUTO: 185 10(3)UL (ref 150–450)
POTASSIUM SERPL-SCNC: 4.6 MMOL/L (ref 3.5–5.1)
RBC # BLD AUTO: 4.4 X10(6)UL
SODIUM SERPL-SCNC: 140 MMOL/L (ref 136–145)
WBC # BLD AUTO: 5.8 X10(3) UL (ref 4–11)

## 2022-09-10 PROCEDURE — 80048 BASIC METABOLIC PNL TOTAL CA: CPT

## 2022-09-10 PROCEDURE — 36415 COLL VENOUS BLD VENIPUNCTURE: CPT

## 2022-09-10 PROCEDURE — 85025 COMPLETE CBC W/AUTO DIFF WBC: CPT

## 2022-09-11 LAB — SARS-COV-2 RNA RESP QL NAA+PROBE: NOT DETECTED

## 2022-09-13 ENCOUNTER — HOSPITAL ENCOUNTER (OUTPATIENT)
Dept: INTERVENTIONAL RADIOLOGY/VASCULAR | Facility: HOSPITAL | Age: 87
Discharge: HOME | End: 2022-09-13
Attending: INTERNAL MEDICINE | Admitting: INTERNAL MEDICINE
Payer: MEDICARE

## 2022-09-13 VITALS
WEIGHT: 150 LBS | BODY MASS INDEX: 22.22 KG/M2 | HEART RATE: 69 BPM | RESPIRATION RATE: 21 BRPM | DIASTOLIC BLOOD PRESSURE: 85 MMHG | TEMPERATURE: 97 F | OXYGEN SATURATION: 95 % | SYSTOLIC BLOOD PRESSURE: 129 MMHG | HEIGHT: 69 IN

## 2022-09-13 DIAGNOSIS — Z45.02 ICD (IMPLANTABLE CARDIOVERTER-DEFIBRILLATOR) BATTERY DEPLETION: ICD-10-CM

## 2022-09-13 PROCEDURE — 33263 RMVL & RPLCMT DFB GEN 2 LEAD: CPT | Performed by: INTERNAL MEDICINE

## 2022-09-13 PROCEDURE — 99153 MOD SED SAME PHYS/QHP EA: CPT | Performed by: INTERNAL MEDICINE

## 2022-09-13 PROCEDURE — 0JPT0PZ REMOVAL OF CARDIAC RHYTHM RELATED DEVICE FROM TRUNK SUBCUTANEOUS TISSUE AND FASCIA, OPEN APPROACH: ICD-10-PCS | Performed by: INTERNAL MEDICINE

## 2022-09-13 PROCEDURE — 99152 MOD SED SAME PHYS/QHP 5/>YRS: CPT | Performed by: INTERNAL MEDICINE

## 2022-09-13 PROCEDURE — 0JH608Z INSERTION OF DEFIBRILLATOR GENERATOR INTO CHEST SUBCUTANEOUS TISSUE AND FASCIA, OPEN APPROACH: ICD-10-PCS | Performed by: INTERNAL MEDICINE

## 2022-09-13 RX ORDER — LIDOCAINE HYDROCHLORIDE 10 MG/ML
INJECTION, SOLUTION EPIDURAL; INFILTRATION; INTRACAUDAL; PERINEURAL
Status: COMPLETED
Start: 2022-09-13 | End: 2022-09-13

## 2022-09-13 RX ORDER — MIDAZOLAM HYDROCHLORIDE 1 MG/ML
INJECTION INTRAMUSCULAR; INTRAVENOUS
Status: DISCONTINUED
Start: 2022-09-13 | End: 2022-09-13 | Stop reason: WASHOUT

## 2022-09-13 RX ORDER — MIDAZOLAM HYDROCHLORIDE 1 MG/ML
INJECTION INTRAMUSCULAR; INTRAVENOUS
Status: COMPLETED
Start: 2022-09-13 | End: 2022-09-13

## 2022-09-13 RX ORDER — VANCOMYCIN HYDROCHLORIDE 1 G/20ML
INJECTION, POWDER, LYOPHILIZED, FOR SOLUTION INTRAVENOUS
Status: COMPLETED
Start: 2022-09-13 | End: 2022-09-13

## 2022-09-13 RX ORDER — CEFAZOLIN SODIUM/WATER 2 G/20 ML
2 SYRINGE (ML) INTRAVENOUS
Status: DISCONTINUED | OUTPATIENT
Start: 2022-09-13 | End: 2022-09-13 | Stop reason: HOSPADM

## 2022-09-13 RX ORDER — CHLORHEXIDINE GLUCONATE 4 G/100ML
30 SOLUTION TOPICAL
Status: DISCONTINUED | OUTPATIENT
Start: 2022-09-14 | End: 2022-09-13 | Stop reason: HOSPADM

## 2022-09-13 RX ORDER — CEFAZOLIN SODIUM/WATER 2 G/20 ML
SYRINGE (ML) INTRAVENOUS
Status: COMPLETED
Start: 2022-09-13 | End: 2022-09-13

## 2022-09-13 RX ORDER — SODIUM CHLORIDE 9 MG/ML
INJECTION, SOLUTION INTRAVENOUS
Status: DISCONTINUED | OUTPATIENT
Start: 2022-09-14 | End: 2022-09-13 | Stop reason: HOSPADM

## 2022-09-13 NOTE — PROCEDURES
OPERATION(S) PERFORMED:   1. Dual chamber ICD implant. 2. ICD generator removal.       : Dontrell Lizarraga MD  INDICATION: Device at [facility]ASE. COMPLICATIONS: None     ESTIMATED BLOOD LOSS: Minimal.    SEDATION: IV was maintained by RN. Patient was assessed by myself and the nursing staff, IV sedation (versed and fentanyl) were administered during continuous ECG, pulse oximeter, and non-invasive hemodynamic monitoring. I was present from the time of sedation being started to the end of the procedure ([id number]). METHODS: The patient was brought to the outpatient cardiac telemetry unit in a fasting and nonsedated state after providing informed consent. IV sedation was administered during continuous ECG, pulse oximeter, and noninvasive hemodynamic monitoring. After administering 1% lidocaine for local anesthesia, an incision was adjacent to the previous incision. The plane of the incision was extended to expose the old generator and leads. The generator was removed from the pocket. The leads were visually inspected, there was no evidence of lead breakdown, the leads appeared to be intact. The pocket was irrigated with antibiotic solution. Bleeding was sought for until hemostasis was achieved. The leads were connected to a pulse generator. They were coiled and placed into the pocket along with the pulse generator. The incision was closed in 2 layers using 2.0, 3.0 Vicryl. Steri-Strips were applied followed by a sterile dressing. The left arm was placed in a sling and the patient was transported to telemetry in stable condition. There were no apparent intraoperative complications. CONCLUSIONS:   1. Status post successful generator change of a  dual chamber ICD. 2. Adequate/stable RA, RV pacing and sensing thresholds.           Uche Tobin MD  [address] [place] [address]  [facility]  [phone]

## 2022-09-14 NOTE — IVS NOTE
Patient discharged post ICD gen change. VSS during recovery. Patient able to eat and drink. Dressing to left chest clean, dry and intact. AVS reviewed with patient and daughter. PIV removed. Patient discharge in wheelchair with all belongings.

## 2022-09-23 NOTE — H&P
Please see scanned office note in EMR,    Pt here for cath. LVEF has diminished. Case discussed with patient and family at bedside. Risks, benefits, alternatives discussed with patient and family. Questions answered. He agrees to proceed.       Hillary Jerez MD

## 2022-09-30 ENCOUNTER — LAB ENCOUNTER (OUTPATIENT)
Dept: LAB | Facility: HOSPITAL | Age: 87
End: 2022-09-30
Attending: NURSE PRACTITIONER
Payer: MEDICARE

## 2022-09-30 DIAGNOSIS — Z01.818 PREOP EXAMINATION: Primary | ICD-10-CM

## 2022-10-01 LAB — SARS-COV-2 RNA RESP QL NAA+PROBE: NOT DETECTED

## 2022-10-03 ENCOUNTER — HOSPITAL ENCOUNTER (OUTPATIENT)
Dept: INTERVENTIONAL RADIOLOGY/VASCULAR | Facility: HOSPITAL | Age: 87
Discharge: HOME | End: 2022-10-03
Attending: INTERNAL MEDICINE | Admitting: INTERNAL MEDICINE
Payer: MEDICARE

## 2022-10-03 VITALS
RESPIRATION RATE: 15 BRPM | OXYGEN SATURATION: 100 % | HEART RATE: 70 BPM | WEIGHT: 150 LBS | TEMPERATURE: 98 F | DIASTOLIC BLOOD PRESSURE: 62 MMHG | HEIGHT: 69 IN | BODY MASS INDEX: 22.22 KG/M2 | SYSTOLIC BLOOD PRESSURE: 114 MMHG

## 2022-10-03 DIAGNOSIS — I51.9 DECREASED LEFT VENTRICULAR FUNCTION: ICD-10-CM

## 2022-10-03 DIAGNOSIS — R93.1 ABNORMAL ECHOCARDIOGRAM: ICD-10-CM

## 2022-10-03 DIAGNOSIS — I25.10 CAD (CORONARY ARTERY DISEASE): ICD-10-CM

## 2022-10-03 LAB — NT-PROBNP SERPL-MCNC: 678 PG/ML (ref ?–450)

## 2022-10-03 PROCEDURE — B2131ZZ FLUOROSCOPY OF MULTIPLE CORONARY ARTERY BYPASS GRAFTS USING LOW OSMOLAR CONTRAST: ICD-10-PCS | Performed by: INTERNAL MEDICINE

## 2022-10-03 PROCEDURE — B2181ZZ FLUOROSCOPY OF LEFT INTERNAL MAMMARY BYPASS GRAFT USING LOW OSMOLAR CONTRAST: ICD-10-PCS | Performed by: INTERNAL MEDICINE

## 2022-10-03 PROCEDURE — 83880 ASSAY OF NATRIURETIC PEPTIDE: CPT | Performed by: INTERNAL MEDICINE

## 2022-10-03 PROCEDURE — 99152 MOD SED SAME PHYS/QHP 5/>YRS: CPT | Performed by: INTERNAL MEDICINE

## 2022-10-03 PROCEDURE — 99153 MOD SED SAME PHYS/QHP EA: CPT | Performed by: INTERNAL MEDICINE

## 2022-10-03 PROCEDURE — 93455 CORONARY ART/GRFT ANGIO S&I: CPT | Performed by: INTERNAL MEDICINE

## 2022-10-03 PROCEDURE — B2111ZZ FLUOROSCOPY OF MULTIPLE CORONARY ARTERIES USING LOW OSMOLAR CONTRAST: ICD-10-PCS | Performed by: INTERNAL MEDICINE

## 2022-10-03 RX ORDER — SODIUM CHLORIDE 9 MG/ML
INJECTION, SOLUTION INTRAVENOUS
Status: DISCONTINUED | OUTPATIENT
Start: 2022-10-04 | End: 2022-10-03 | Stop reason: HOSPADM

## 2022-10-03 RX ORDER — LIDOCAINE HYDROCHLORIDE 10 MG/ML
INJECTION, SOLUTION EPIDURAL; INFILTRATION; INTRACAUDAL; PERINEURAL
Status: COMPLETED
Start: 2022-10-03 | End: 2022-10-03

## 2022-10-03 RX ORDER — HEPARIN SODIUM 5000 [USP'U]/ML
INJECTION, SOLUTION INTRAVENOUS; SUBCUTANEOUS
Status: COMPLETED
Start: 2022-10-03 | End: 2022-10-03

## 2022-10-03 RX ORDER — MIDAZOLAM HYDROCHLORIDE 1 MG/ML
INJECTION INTRAMUSCULAR; INTRAVENOUS
Status: COMPLETED
Start: 2022-10-03 | End: 2022-10-03

## 2022-10-03 NOTE — PROCEDURES
[facility]    PATIENT'S NAME: Amy Zhu   ATTENDING PHYSICIAN: Viv Quintanilla M.D. OPERATING PHYSICIAN: Viv Quintanilla M.D. PATIENT ACCOUNT#:   [de-identified]    LOCATION:  [address]  MEDICAL RECORD #:   [id number]       YOB: 1934  ADMISSION DATE:       10/03/2022      OPERATION DATE:  10/03/2022    CARDIAC PROCEDURE TRANSCRIPTION    CARDIAC CATHETERIZATION     PREOPERATIVE DIAGNOSIS:    1. Unexplained reduction of left ventricular systolic function. 2.   History of coronary artery bypass grafting surgery. 3.   History of implantable cardioverter defibrillator placement. POSTOPERATIVE DIAGNOSIS:  Coronary artery disease. PROCEDURE PERFORMED:      SEDATION:  The patient received conscious sedation. This was monitored by myself and the catheterization lab staff. This started at 8:16 a.m. and ended at 8:59 a.m. CLINICAL HISTORY:  The patient presents today for angiography. The patient had reduction of left ventricular systolic function. He was referred for angiography. DESCRIPTION OF PROCEDURE:  After informed consent obtained, patient was prepped and draped in the usual sterile fashion. Using ultrasound, right common femoral artery was identified and entered using a micropuncture needle. A micropuncture sheath followed, followed by placement of a long 6-Yi sheath. Patient appeared to have fairly tortuous iliac arteries. Pedrito right and left coronary bypass grafting catheters were used for angiography. At the end of the case, a 6-Yi Angio-Seal was used to seal the right common femoral artery site. There were no apparent acute complications noted, and the patient tolerated the procedure well. CORONARIES:  Injection of the left main coronary artery revealed a 50% stenosis in the distal left main which extends into the proximal LAD. The proximal LAD has a calcified area of stenosis of about 70%.   LAD is calcified throughout with diffuse disease. There is a 70% stenosis in its mid to distal section which is intramyocardial.  There is a LIMA that appears to provide competitive flow distally. A diagonal branch is also noted in its midsection which has competitive flow. The circumflex coronary artery has a 90% stenosis in its proximal section. A large obtuse marginal branch is occluded. There is a smaller posterolateral branch that emanates from the circumflex. Right coronary artery is a diffusely calcified vessel which is occluded proximally. GRAFT ANGIOGRAPHY:    1. The saphenous vein graft to the obtuse marginal branch is widely patent. 2.   The saphenous vein graft to the diagonal branch is widely patent. This provides retrograde flow to the LAD as well. 3.   The saphenous vein graft to the right coronary artery is occluded. 4.   The left internal mammary artery graft is patent to the LAD. This is a somewhat smaller caliber vessel yet patent. I was unable to cannulate this directly due to tortuosity in the iliac and complicated by tortuosity in the arch. 5.   Subclavian angiography revealed widely patent subclavian. This was done with a JR4 catheter. SUMMARY:    1. Diffuse coronary artery disease. 2.   Patent internal mammary artery graft to left anterior descending artery, saphenous vein grafts to the diagonal and obtuse marginal branch. The saphenous vein graft to the right coronary artery is occluded. RECOMMENDATION:  Patient will be managed medically at this point.      Dictated By Nusrat Jane M.D.  d: 10/03/2022 09:04:28  t: 10/03/2022 12:52:40  [facility] [id number]  XW/

## 2022-10-03 NOTE — PLAN OF CARE
Patient [facility] [place] today with Dr. Tracie Ruiz. Right groin site soft, CDI, no hematoma, +1 pedal pulse. Patient denies any pain. VSS. Patient's daughter @ bedside. Patient tolerating po intake. Bedrest completed. Patient ambulated in room and to BR. Patient voided. Groin stable. Dr. Tracie Ruiz @ bedside post procedure. Discharge instructions reviewed. IV D/C'd. Patient discharged to [facility] by wheelchair with belongings. Patient's daughter is .

## 2023-04-26 ENCOUNTER — APPOINTMENT (OUTPATIENT)
Dept: CT IMAGING | Age: 88
DRG: 086 | End: 2023-04-26
Attending: STUDENT IN AN ORGANIZED HEALTH CARE EDUCATION/TRAINING PROGRAM

## 2023-04-26 ENCOUNTER — APPOINTMENT (OUTPATIENT)
Dept: CT IMAGING | Age: 88
DRG: 086 | End: 2023-04-26
Attending: EMERGENCY MEDICINE

## 2023-04-26 ENCOUNTER — HOSPITAL ENCOUNTER (EMERGENCY)
Age: 88
Discharge: ANOTHER HOSPITAL | DRG: 086 | End: 2023-04-26
Attending: STUDENT IN AN ORGANIZED HEALTH CARE EDUCATION/TRAINING PROGRAM

## 2023-04-26 ENCOUNTER — APPOINTMENT (OUTPATIENT)
Dept: ULTRASOUND IMAGING | Age: 88
DRG: 086 | End: 2023-04-26
Attending: SURGERY

## 2023-04-26 ENCOUNTER — APPOINTMENT (OUTPATIENT)
Dept: GENERAL RADIOLOGY | Age: 88
DRG: 086 | End: 2023-04-26
Attending: STUDENT IN AN ORGANIZED HEALTH CARE EDUCATION/TRAINING PROGRAM

## 2023-04-26 ENCOUNTER — APPOINTMENT (OUTPATIENT)
Dept: GENERAL RADIOLOGY | Age: 88
DRG: 086 | End: 2023-04-26
Attending: EMERGENCY MEDICINE

## 2023-04-26 ENCOUNTER — APPOINTMENT (OUTPATIENT)
Dept: CARDIOLOGY | Age: 88
DRG: 086 | End: 2023-04-26
Attending: STUDENT IN AN ORGANIZED HEALTH CARE EDUCATION/TRAINING PROGRAM

## 2023-04-26 ENCOUNTER — HOSPITAL ENCOUNTER (INPATIENT)
Age: 88
LOS: 6 days | Discharge: NURSING, REHAB OR LONG-TERM CARE | DRG: 086 | End: 2023-05-02
Attending: SURGERY | Admitting: GENERAL PRACTICE

## 2023-04-26 VITALS
WEIGHT: 145 LBS | OXYGEN SATURATION: 95 % | HEART RATE: 68 BPM | HEIGHT: 69 IN | DIASTOLIC BLOOD PRESSURE: 63 MMHG | BODY MASS INDEX: 21.48 KG/M2 | TEMPERATURE: 98.9 F | RESPIRATION RATE: 20 BRPM | SYSTOLIC BLOOD PRESSURE: 104 MMHG

## 2023-04-26 DIAGNOSIS — S50.01XA CONTUSION OF RIGHT ELBOW, INITIAL ENCOUNTER: ICD-10-CM
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
## 2023-04-26 DIAGNOSIS — M25.511 ACUTE PAIN OF RIGHT SHOULDER: ICD-10-CM

## 2023-04-26 DIAGNOSIS — S02.92XA CLOSED EXTENSIVE FACIAL FRACTURES, INITIAL ENCOUNTER (CMD): Primary | ICD-10-CM

## 2023-04-26 DIAGNOSIS — S02.40EA CLOSED FRACTURE OF RIGHT ZYGOMATIC ARCH, INITIAL ENCOUNTER (CMD): Primary | ICD-10-CM

## 2023-04-26 DIAGNOSIS — S60.211A CONTUSION OF RIGHT WRIST, INITIAL ENCOUNTER: ICD-10-CM
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
## 2023-04-26 DIAGNOSIS — S02.85XA ORBITAL FRACTURE, CLOSED, INITIAL ENCOUNTER (CMD): ICD-10-CM

## 2023-04-26 DIAGNOSIS — S02.40CA CLOSED FRACTURE OF RIGHT SIDE OF MAXILLA, INITIAL ENCOUNTER (CMD): ICD-10-CM

## 2023-04-26 LAB
ABO + RH BLD: NORMAL
ABO + RH BLD: NORMAL
ALBUMIN SERPL-MCNC: 3.3 G/DL (ref 3.6–5.1)
ALBUMIN/GLOB SERPL: 1 {RATIO} (ref 1–2.4)
ALP SERPL-CCNC: 64 UNITS/L (ref 45–117)
ALT SERPL-CCNC: 34 UNITS/L
ANION GAP SERPL CALC-SCNC: 11 MMOL/L (ref 7–19)
ANION GAP SERPL CALC-SCNC: 12 MMOL/L (ref 7–19)
APPEARANCE UR: CLEAR
APTT PPP: 29 SEC (ref 22–30)
AST SERPL-CCNC: 45 UNITS/L
BASOPHILS # BLD: 0 K/MCL (ref 0–0.3)
BASOPHILS # BLD: 0 K/MCL (ref 0–0.3)
BASOPHILS NFR BLD: 0 %
BASOPHILS NFR BLD: 1 %
BILIRUB SERPL-MCNC: 0.9 MG/DL (ref 0.2–1)
BILIRUB UR QL STRIP: NEGATIVE
BLD GP AB SCN SERPL QL GEL: NEGATIVE
BLD GP AB SCN SERPL QL GEL: NEGATIVE
BUN SERPL-MCNC: 21 MG/DL (ref 6–20)
BUN SERPL-MCNC: 24 MG/DL (ref 6–20)
BUN/CREAT SERPL: 30 (ref 7–25)
BUN/CREAT SERPL: 32 (ref 7–25)
CALCIUM SERPL-MCNC: 8.1 MG/DL (ref 8.4–10.2)
CALCIUM SERPL-MCNC: 9 MG/DL (ref 8.4–10.2)
CHLORIDE SERPL-SCNC: 104 MMOL/L (ref 97–110)
CHLORIDE SERPL-SCNC: 106 MMOL/L (ref 97–110)
CO2 SERPL-SCNC: 27 MMOL/L (ref 21–32)
CO2 SERPL-SCNC: 29 MMOL/L (ref 21–32)
COLOR UR: ABNORMAL
CREAT SERPL-MCNC: 0.7 MG/DL (ref 0.67–1.17)
CREAT SERPL-MCNC: 0.75 MG/DL (ref 0.67–1.17)
DEPRECATED RDW RBC: 49.9 FL (ref 39–50)
DEPRECATED RDW RBC: 50.1 FL (ref 39–50)
EOSINOPHIL # BLD: 0 K/MCL (ref 0–0.5)
EOSINOPHIL # BLD: 0 K/MCL (ref 0–0.5)
EOSINOPHIL NFR BLD: 0 %
EOSINOPHIL NFR BLD: 0 %
ERYTHROCYTE [DISTWIDTH] IN BLOOD: 13.5 % (ref 11–15)
ERYTHROCYTE [DISTWIDTH] IN BLOOD: 13.5 % (ref 11–15)
ETHANOL SERPL-MCNC: NORMAL MG/DL
FASTING DURATION TIME PATIENT: ABNORMAL H
FASTING DURATION TIME PATIENT: ABNORMAL H
GFR SERPLBLD BASED ON 1.73 SQ M-ARVRAT: 86 ML/MIN
GFR SERPLBLD BASED ON 1.73 SQ M-ARVRAT: 88 ML/MIN
GLOBULIN SER-MCNC: 3.3 G/DL (ref 2–4)
GLUCOSE SERPL-MCNC: 106 MG/DL (ref 70–99)
GLUCOSE SERPL-MCNC: 106 MG/DL (ref 70–99)
GLUCOSE UR STRIP-MCNC: ABNORMAL MG/DL
HCT VFR BLD CALC: 38.6 % (ref 39–51)
HCT VFR BLD CALC: 43.7 % (ref 39–51)
HGB BLD-MCNC: 12.7 G/DL (ref 13–17)
HGB BLD-MCNC: 14.4 G/DL (ref 13–17)
HGB UR QL STRIP: NEGATIVE
IMM GRANULOCYTES # BLD AUTO: 0 K/MCL (ref 0–0.2)
IMM GRANULOCYTES # BLD AUTO: 0.1 K/MCL (ref 0–0.2)
IMM GRANULOCYTES # BLD: 0 %
IMM GRANULOCYTES # BLD: 1 %
INR PPP: 1.1
INR PPP: 1.2
KETONES UR STRIP-MCNC: NEGATIVE MG/DL
LEUKOCYTE ESTERASE UR QL STRIP: NEGATIVE
LYMPHOCYTES # BLD: 0.8 K/MCL (ref 1–4)
LYMPHOCYTES # BLD: 0.9 K/MCL (ref 1–4)
LYMPHOCYTES NFR BLD: 13 %
LYMPHOCYTES NFR BLD: 13 %
MCH RBC QN AUTO: 32.9 PG (ref 26–34)
MCH RBC QN AUTO: 33.1 PG (ref 26–34)
MCHC RBC AUTO-ENTMCNC: 32.9 G/DL (ref 32–36.5)
MCHC RBC AUTO-ENTMCNC: 33 G/DL (ref 32–36.5)
MCV RBC AUTO: 100 FL (ref 78–100)
MCV RBC AUTO: 100.5 FL (ref 78–100)
MONOCYTES # BLD: 0.8 K/MCL (ref 0.3–0.9)
MONOCYTES # BLD: 0.9 K/MCL (ref 0.3–0.9)
MONOCYTES NFR BLD: 11 %
MONOCYTES NFR BLD: 16 %
NEUTROPHILS # BLD: 4.1 K/MCL (ref 1.8–7.7)
NEUTROPHILS # BLD: 5.7 K/MCL (ref 1.8–7.7)
NEUTROPHILS NFR BLD: 70 %
NEUTROPHILS NFR BLD: 75 %
NITRITE UR QL STRIP: NEGATIVE
NRBC BLD MANUAL-RTO: 0 /100 WBC
NRBC BLD MANUAL-RTO: 0 /100 WBC
PH UR STRIP: 5.5 [PH] (ref 5–7)
PLATELET # BLD AUTO: 181 K/MCL (ref 140–450)
PLATELET # BLD AUTO: 206 K/MCL (ref 140–450)
POTASSIUM SERPL-SCNC: 3.8 MMOL/L (ref 3.4–5.1)
POTASSIUM SERPL-SCNC: 4.4 MMOL/L (ref 3.4–5.1)
PROT SERPL-MCNC: 6.6 G/DL (ref 6.4–8.2)
PROT UR STRIP-MCNC: NEGATIVE MG/DL
PROTHROMBIN TIME: 11.4 SEC (ref 9.7–11.8)
PROTHROMBIN TIME: 11.9 SEC (ref 9.7–11.8)
RBC # BLD: 3.86 MIL/MCL (ref 4.5–5.9)
RBC # BLD: 4.35 MIL/MCL (ref 4.5–5.9)
SODIUM SERPL-SCNC: 140 MMOL/L (ref 135–145)
SODIUM SERPL-SCNC: 141 MMOL/L (ref 135–145)
SP GR UR STRIP: 1.01 (ref 1–1.03)
TROPONIN I SERPL DL<=0.01 NG/ML-MCNC: 19 NG/L
TYPE AND SCREEN EXPIRATION DATE: NORMAL
TYPE AND SCREEN EXPIRATION DATE: NORMAL
UROBILINOGEN UR STRIP-MCNC: 0.2 MG/DL
WBC # BLD: 5.9 K/MCL (ref 4.2–11)
WBC # BLD: 7.5 K/MCL (ref 4.2–11)

## 2023-04-26 PROCEDURE — 85730 THROMBOPLASTIN TIME PARTIAL: CPT | Performed by: SURGERY

## 2023-04-26 PROCEDURE — G1004 CDSM NDSC: HCPCS

## 2023-04-26 PROCEDURE — 93289 INTERROG DEVICE EVAL HEART: CPT | Performed by: INTERNAL MEDICINE

## 2023-04-26 PROCEDURE — 10002800 HB RX 250 W HCPCS: Performed by: STUDENT IN AN ORGANIZED HEALTH CARE EDUCATION/TRAINING PROGRAM

## 2023-04-26 PROCEDURE — 36415 COLL VENOUS BLD VENIPUNCTURE: CPT

## 2023-04-26 PROCEDURE — 71250 CT THORAX DX C-: CPT

## 2023-04-26 PROCEDURE — 70450 CT HEAD/BRAIN W/O DYE: CPT

## 2023-04-26 PROCEDURE — 93880 EXTRACRANIAL BILAT STUDY: CPT

## 2023-04-26 PROCEDURE — 80048 BASIC METABOLIC PNL TOTAL CA: CPT | Performed by: STUDENT IN AN ORGANIZED HEALTH CARE EDUCATION/TRAINING PROGRAM

## 2023-04-26 PROCEDURE — 73070 X-RAY EXAM OF ELBOW: CPT

## 2023-04-26 PROCEDURE — 82077 ASSAY SPEC XCP UR&BREATH IA: CPT | Performed by: SURGERY

## 2023-04-26 PROCEDURE — 10000002 HB ROOM CHARGE MED SURG

## 2023-04-26 PROCEDURE — 84484 ASSAY OF TROPONIN QUANT: CPT | Performed by: STUDENT IN AN ORGANIZED HEALTH CARE EDUCATION/TRAINING PROGRAM

## 2023-04-26 PROCEDURE — 90715 TDAP VACCINE 7 YRS/> IM: CPT | Performed by: STUDENT IN AN ORGANIZED HEALTH CARE EDUCATION/TRAINING PROGRAM

## 2023-04-26 PROCEDURE — 85025 COMPLETE CBC W/AUTO DIFF WBC: CPT | Performed by: STUDENT IN AN ORGANIZED HEALTH CARE EDUCATION/TRAINING PROGRAM

## 2023-04-26 PROCEDURE — 71045 X-RAY EXAM CHEST 1 VIEW: CPT

## 2023-04-26 PROCEDURE — 99285 EMERGENCY DEPT VISIT HI MDM: CPT | Performed by: STUDENT IN AN ORGANIZED HEALTH CARE EDUCATION/TRAINING PROGRAM

## 2023-04-26 PROCEDURE — 93005 ELECTROCARDIOGRAM TRACING: CPT | Performed by: STUDENT IN AN ORGANIZED HEALTH CARE EDUCATION/TRAINING PROGRAM

## 2023-04-26 PROCEDURE — 99285 EMERGENCY DEPT VISIT HI MDM: CPT

## 2023-04-26 PROCEDURE — 93289 INTERROG DEVICE EVAL HEART: CPT

## 2023-04-26 PROCEDURE — 86901 BLOOD TYPING SEROLOGIC RH(D): CPT | Performed by: SURGERY

## 2023-04-26 PROCEDURE — 81003 URINALYSIS AUTO W/O SCOPE: CPT | Performed by: STUDENT IN AN ORGANIZED HEALTH CARE EDUCATION/TRAINING PROGRAM

## 2023-04-26 PROCEDURE — 73110 X-RAY EXAM OF WRIST: CPT

## 2023-04-26 PROCEDURE — 90471 IMMUNIZATION ADMIN: CPT | Performed by: STUDENT IN AN ORGANIZED HEALTH CARE EDUCATION/TRAINING PROGRAM

## 2023-04-26 PROCEDURE — 10004180 HB COUNTER-TRANSPORT

## 2023-04-26 PROCEDURE — 73200 CT UPPER EXTREMITY W/O DYE: CPT

## 2023-04-26 PROCEDURE — 80053 COMPREHEN METABOLIC PANEL: CPT | Performed by: SURGERY

## 2023-04-26 PROCEDURE — 99291 CRITICAL CARE FIRST HOUR: CPT | Performed by: STUDENT IN AN ORGANIZED HEALTH CARE EDUCATION/TRAINING PROGRAM

## 2023-04-26 PROCEDURE — 36415 COLL VENOUS BLD VENIPUNCTURE: CPT | Performed by: STUDENT IN AN ORGANIZED HEALTH CARE EDUCATION/TRAINING PROGRAM

## 2023-04-26 PROCEDURE — 85610 PROTHROMBIN TIME: CPT | Performed by: STUDENT IN AN ORGANIZED HEALTH CARE EDUCATION/TRAINING PROGRAM

## 2023-04-26 PROCEDURE — 85025 COMPLETE CBC W/AUTO DIFF WBC: CPT | Performed by: SURGERY

## 2023-04-26 PROCEDURE — 93010 ELECTROCARDIOGRAM REPORT: CPT | Performed by: INTERNAL MEDICINE

## 2023-04-26 PROCEDURE — 10002803 HB RX 637: Performed by: STUDENT IN AN ORGANIZED HEALTH CARE EDUCATION/TRAINING PROGRAM

## 2023-04-26 PROCEDURE — 10004651 HB RX, NO CHARGE ITEM: Performed by: STUDENT IN AN ORGANIZED HEALTH CARE EDUCATION/TRAINING PROGRAM

## 2023-04-26 PROCEDURE — 85610 PROTHROMBIN TIME: CPT | Performed by: SURGERY

## 2023-04-26 PROCEDURE — 86850 RBC ANTIBODY SCREEN: CPT | Performed by: STUDENT IN AN ORGANIZED HEALTH CARE EDUCATION/TRAINING PROGRAM

## 2023-04-26 PROCEDURE — 73030 X-RAY EXAM OF SHOULDER: CPT

## 2023-04-26 PROCEDURE — 70486 CT MAXILLOFACIAL W/O DYE: CPT

## 2023-04-26 PROCEDURE — 12011 RPR F/E/E/N/L/M 2.5 CM/<: CPT | Performed by: STUDENT IN AN ORGANIZED HEALTH CARE EDUCATION/TRAINING PROGRAM

## 2023-04-26 RX ORDER — ACETAMINOPHEN 325 MG/1
975 TABLET ORAL ONCE
Status: COMPLETED | OUTPATIENT
Start: 2023-04-26 | End: 2023-04-26

## 2023-04-26 RX ORDER — SACUBITRIL AND VALSARTAN 24; 26 MG/1; MG/1
1 TABLET, FILM COATED ORAL 2 TIMES DAILY
COMMUNITY

## 2023-04-26 RX ORDER — DOCUSATE SODIUM 100 MG/1
100 CAPSULE, LIQUID FILLED ORAL DAILY
Status: DISCONTINUED | OUTPATIENT
Start: 2023-04-27 | End: 2023-04-29

## 2023-04-26 RX ORDER — 0.9 % SODIUM CHLORIDE 0.9 %
2 VIAL (ML) INJECTION EVERY 12 HOURS SCHEDULED
Status: DISCONTINUED | OUTPATIENT
Start: 2023-04-26 | End: 2023-05-02 | Stop reason: HOSPADM

## 2023-04-26 RX ORDER — BACITRACIN ZINC 500 [USP'U]/G
OINTMENT TOPICAL ONCE
Status: DISCONTINUED | OUTPATIENT
Start: 2023-04-26 | End: 2023-04-26 | Stop reason: HOSPADM

## 2023-04-26 RX ORDER — AMOXICILLIN AND CLAVULANATE POTASSIUM 875; 125 MG/1; MG/1
1 TABLET, FILM COATED ORAL ONCE
Status: COMPLETED | OUTPATIENT
Start: 2023-04-26 | End: 2023-04-26

## 2023-04-26 RX ORDER — POLYETHYLENE GLYCOL 3350 17 G/17G
17 POWDER, FOR SOLUTION ORAL DAILY
Status: DISCONTINUED | OUTPATIENT
Start: 2023-04-27 | End: 2023-04-29

## 2023-04-26 RX ORDER — ENOXAPARIN SODIUM 100 MG/ML
40 INJECTION SUBCUTANEOUS NIGHTLY
Status: DISCONTINUED | OUTPATIENT
Start: 2023-04-26 | End: 2023-04-27

## 2023-04-26 RX ORDER — LIDOCAINE HYDROCHLORIDE 10 MG/ML
5 INJECTION, SOLUTION EPIDURAL; INFILTRATION; INTRACAUDAL; PERINEURAL ONCE
Status: DISCONTINUED | OUTPATIENT
Start: 2023-04-26 | End: 2023-04-26 | Stop reason: HOSPADM

## 2023-04-26 RX ADMIN — ACETAMINOPHEN 975 MG: 325 TABLET ORAL at 08:44

## 2023-04-26 RX ADMIN — TETANUS TOXOID, REDUCED DIPHTHERIA TOXOID AND ACELLULAR PERTUSSIS VACCINE, ADSORBED 0.5 ML: 5; 2.5; 8; 8; 2.5 SUSPENSION INTRAMUSCULAR at 08:45

## 2023-04-26 RX ADMIN — AMOXICILLIN AND CLAVULANATE POTASSIUM 1 TABLET: 875; 125 TABLET, FILM COATED ORAL at 08:44

## 2023-04-26 RX ADMIN — SODIUM CHLORIDE, PRESERVATIVE FREE 2 ML: 5 INJECTION INTRAVENOUS at 22:15

## 2023-04-26 SDOH — ECONOMIC STABILITY: HOUSING INSECURITY: WHAT IS YOUR LIVING SITUATION TODAY?: ALONE

## 2023-04-26 SDOH — SOCIAL STABILITY: SOCIAL NETWORK: SUPPORT SYSTEMS: CHILDREN;FAMILY MEMBERS;FRIENDS;CHURCH/FAITH COMMUNITY

## 2023-04-26 SDOH — ECONOMIC STABILITY: HOUSING INSECURITY: WHAT IS YOUR LIVING SITUATION TODAY?: HOUSE

## 2023-04-26 SDOH — HEALTH STABILITY: GENERAL
BECAUSE OF A PHYSICAL, MENTAL, OR EMOTIONAL CONDITION, DO YOU HAVE SERIOUS DIFFICULTY CONCENTRATING, REMEMBERING OR MAKING DECISIONS?: NO

## 2023-04-26 SDOH — ECONOMIC STABILITY: GENERAL

## 2023-04-26 SDOH — HEALTH STABILITY: PHYSICAL HEALTH: DO YOU HAVE SERIOUS DIFFICULTY WALKING OR CLIMBING STAIRS?: NO

## 2023-04-26 SDOH — ECONOMIC STABILITY: FOOD INSECURITY: HOW OFTEN IN THE PAST 12 MONTHS WERE YOU WORRIED OR STRESSED ABOUT HAVING ENOUGH MONEY TO BUY NUTRITIOUS MEALS?: NEVER

## 2023-04-26 SDOH — ECONOMIC STABILITY: TRANSPORTATION INSECURITY
IN THE PAST 12 MONTHS, HAS LACK OF TRANSPORTATION KEPT YOU FROM MEETINGS, WORK, OR FROM GETTING THINGS NEEDED FOR DAILY LIVING?: NO

## 2023-04-26 SDOH — HEALTH STABILITY: PHYSICAL HEALTH: DO YOU HAVE DIFFICULTY DRESSING OR BATHING?: NO

## 2023-04-26 SDOH — SOCIAL STABILITY: SOCIAL NETWORK
HOW OFTEN DO YOU SEE OR TALK TO PEOPLE THAT YOU CARE ABOUT AND FEEL CLOSE TO? (FOR EXAMPLE: TALKING TO FRIENDS ON THE PHONE, VISITING FRIENDS OR FAMILY, GOING TO CHURCH OR CLUB MEETINGS): 3 TO 5 TIMES A WEEK

## 2023-04-26 SDOH — HEALTH STABILITY: GENERAL: BECAUSE OF A PHYSICAL, MENTAL, OR EMOTIONAL CONDITION, DO YOU HAVE DIFFICULTY DOING ERRANDS ALONE?: NO

## 2023-04-26 SDOH — ECONOMIC STABILITY: HOUSING INSECURITY: ARE YOU WORRIED ABOUT LOSING YOUR HOUSING?: NO

## 2023-04-26 SDOH — ECONOMIC STABILITY: TRANSPORTATION INSECURITY
IN THE PAST 12 MONTHS, HAS THE LACK OF TRANSPORTATION KEPT YOU FROM MEDICAL APPOINTMENTS OR FROM GETTING MEDICATIONS?: NO

## 2023-04-26 ASSESSMENT — PAIN SCALES - GENERAL
PAINLEVEL_OUTOF10: 0
PAINLEVEL_OUTOF10: 3
PAINLEVEL_OUTOF10: 0

## 2023-04-26 ASSESSMENT — MOVEMENT AND STRENGTH ASSESSMENTS: HEAD_NECK: FULL RANGE OF MOTION

## 2023-04-26 ASSESSMENT — PATIENT HEALTH QUESTIONNAIRE - PHQ9
1. LITTLE INTEREST OR PLEASURE IN DOING THINGS: NOT AT ALL
2. FEELING DOWN, DEPRESSED OR HOPELESS: NOT AT ALL
SUM OF ALL RESPONSES TO PHQ9 QUESTIONS 1 AND 2: 0
SUM OF ALL RESPONSES TO PHQ9 QUESTIONS 1 AND 2: 0
IS PATIENT ABLE TO COMPLETE PHQ2 OR PHQ9: YES
CLINICAL INTERPRETATION OF PHQ2 SCORE: NO FURTHER SCREENING NEEDED

## 2023-04-26 ASSESSMENT — COLUMBIA-SUICIDE SEVERITY RATING SCALE - C-SSRS
IS THE PATIENT ABLE TO COMPLETE C-SSRS: YES
6. HAVE YOU EVER DONE ANYTHING, STARTED TO DO ANYTHING, OR PREPARED TO DO ANYTHING TO END YOUR LIFE?: NO
1. WITHIN THE PAST MONTH, HAVE YOU WISHED YOU WERE DEAD OR WISHED YOU COULD GO TO SLEEP AND NOT WAKE UP?: NO
2. HAVE YOU ACTUALLY HAD ANY THOUGHTS OF KILLING YOURSELF?: NO

## 2023-04-26 ASSESSMENT — LIFESTYLE VARIABLES
HOW MANY STANDARD DRINKS CONTAINING ALCOHOL DO YOU HAVE ON A TYPICAL DAY: 0,1 OR 2
HOW OFTEN DO YOU HAVE A DRINK CONTAINING ALCOHOL: NEVER
AUDIT-C TOTAL SCORE: 0
ALCOHOL_USE_STATUS: NO OR LOW RISK WITH VALIDATED TOOL
HOW OFTEN DO YOU HAVE 6 OR MORE DRINKS ON ONE OCCASION: NEVER

## 2023-04-26 ASSESSMENT — ENCOUNTER SYMPTOMS
COUGH: 0
FEVER: 0
SORE THROAT: 0
LIGHT-HEADEDNESS: 0
CHILLS: 0
SHORTNESS OF BREATH: 0
ABDOMINAL PAIN: 0
VOMITING: 0
BACK PAIN: 0
NAUSEA: 0
NERVOUS/ANXIOUS: 0
DIZZINESS: 0

## 2023-04-26 ASSESSMENT — ACTIVITIES OF DAILY LIVING (ADL)
ADL_SHORT_OF_BREATH: NO
ADL_SCORE: 12
ADL_BEFORE_ADMISSION: INDEPENDENT
RECENT_DECLINE_ADL: NO

## 2023-04-27 ENCOUNTER — APPOINTMENT (OUTPATIENT)
Dept: CARDIOLOGY | Age: 88
DRG: 086 | End: 2023-04-27
Attending: SURGERY

## 2023-04-27 LAB
AORTIC VALVE AREA (AVA): 0.68
ASCENDING AORTA (AAD): 15
ATRIAL RATE (BPM): 70
AV STENOSIS SEVERITY TEXT: NORMAL
AVI LVOT PEAK GRADIENT (LVOTMG): 1
E WAVE DECELARATION TIME (MDT): 20.89
LEFT INTERNAL DIMENSION IN SYSTOLE (LVSD): 1.2
LEFT VENTRICULAR INTERNAL DIMENSION IN DIASTOLE (LVDD): 3.2
LEFT VENTRICULAR POSTERIOR WALL IN END DIASTOLE (LVPW): 4.5
LV EF: NORMAL %
MV E TISSUE VEL MED (MESV): 9.9
MV E WAVE VEL/E TISSUE VEL MED(MSR): 3.26
MV PEAK A VELOCITY (MVPAV): 159
MV PEAK E VELOCITY (MVPEV): 0.58
QRS-INTERVAL (MSEC): 144
QT-INTERVAL (MSEC): 398
QTC: 429
R AXIS (DEGREES): 92
REPORT TEXT: NORMAL
T AXIS (DEGREES): -56
TRICUSPID VALVE ANNULAR PEAK VELOCITY (TVAPV): 31
TRICUSPID VALVE PEAK REGURGITATION VELOCITY (TRPV): 4.1
VENTRICULAR RATE EKG/MIN (BPM): 70

## 2023-04-27 PROCEDURE — 93306 TTE W/DOPPLER COMPLETE: CPT | Performed by: INTERNAL MEDICINE

## 2023-04-27 PROCEDURE — 10002800 HB RX 250 W HCPCS

## 2023-04-27 PROCEDURE — 99223 1ST HOSP IP/OBS HIGH 75: CPT | Performed by: INTERNAL MEDICINE

## 2023-04-27 PROCEDURE — 93306 TTE W/DOPPLER COMPLETE: CPT

## 2023-04-27 PROCEDURE — 10002803 HB RX 637: Performed by: STUDENT IN AN ORGANIZED HEALTH CARE EDUCATION/TRAINING PROGRAM

## 2023-04-27 PROCEDURE — 99233 SBSQ HOSP IP/OBS HIGH 50: CPT | Performed by: SURGERY

## 2023-04-27 PROCEDURE — 10004651 HB RX, NO CHARGE ITEM

## 2023-04-27 PROCEDURE — 10000002 HB ROOM CHARGE MED SURG

## 2023-04-27 PROCEDURE — 10002803 HB RX 637

## 2023-04-27 PROCEDURE — 10004651 HB RX, NO CHARGE ITEM: Performed by: STUDENT IN AN ORGANIZED HEALTH CARE EDUCATION/TRAINING PROGRAM

## 2023-04-27 RX ORDER — ACETAMINOPHEN 325 MG/1
650 TABLET ORAL EVERY 6 HOURS PRN
Status: DISCONTINUED | OUTPATIENT
Start: 2023-04-27 | End: 2023-05-02 | Stop reason: HOSPADM

## 2023-04-27 RX ORDER — PANTOPRAZOLE SODIUM 40 MG/1
40 TABLET, DELAYED RELEASE ORAL
Status: DISCONTINUED | OUTPATIENT
Start: 2023-04-27 | End: 2023-05-02 | Stop reason: HOSPADM

## 2023-04-27 RX ORDER — ENOXAPARIN SODIUM 100 MG/ML
40 INJECTION SUBCUTANEOUS EVERY 12 HOURS
Status: DISCONTINUED | OUTPATIENT
Start: 2023-04-27 | End: 2023-04-27

## 2023-04-27 RX ORDER — FINASTERIDE 5 MG/1
5 TABLET, FILM COATED ORAL DAILY
Status: DISCONTINUED | OUTPATIENT
Start: 2023-04-27 | End: 2023-05-02 | Stop reason: HOSPADM

## 2023-04-27 RX ORDER — ATORVASTATIN CALCIUM 10 MG/1
10 TABLET, FILM COATED ORAL
Status: DISCONTINUED | OUTPATIENT
Start: 2023-04-27 | End: 2023-05-02 | Stop reason: HOSPADM

## 2023-04-27 RX ORDER — METOPROLOL SUCCINATE 25 MG/1
25 TABLET, EXTENDED RELEASE ORAL DAILY
Status: DISCONTINUED | OUTPATIENT
Start: 2023-04-27 | End: 2023-05-02 | Stop reason: HOSPADM

## 2023-04-27 RX ORDER — ENOXAPARIN SODIUM 100 MG/ML
30 INJECTION SUBCUTANEOUS EVERY 12 HOURS
Status: DISCONTINUED | OUTPATIENT
Start: 2023-04-27 | End: 2023-05-02 | Stop reason: HOSPADM

## 2023-04-27 RX ADMIN — SACUBITRIL AND VALSARTAN 1 TABLET: 24; 26 TABLET, FILM COATED ORAL at 20:46

## 2023-04-27 RX ADMIN — SACUBITRIL AND VALSARTAN 1 TABLET: 24; 26 TABLET, FILM COATED ORAL at 09:26

## 2023-04-27 RX ADMIN — DOCUSATE SODIUM 100 MG: 100 CAPSULE, LIQUID FILLED ORAL at 09:29

## 2023-04-27 RX ADMIN — ASPIRIN 81 MG: 81 TABLET, COATED ORAL at 09:27

## 2023-04-27 RX ADMIN — METOPROLOL SUCCINATE 25 MG: 25 TABLET, EXTENDED RELEASE ORAL at 09:27

## 2023-04-27 RX ADMIN — ENOXAPARIN SODIUM 30 MG: 40 INJECTION SUBCUTANEOUS at 09:26

## 2023-04-27 RX ADMIN — SODIUM CHLORIDE, PRESERVATIVE FREE 2 ML: 5 INJECTION INTRAVENOUS at 09:27

## 2023-04-27 RX ADMIN — ENOXAPARIN SODIUM 30 MG: 40 INJECTION SUBCUTANEOUS at 20:46

## 2023-04-27 RX ADMIN — SODIUM CHLORIDE, PRESERVATIVE FREE 2 ML: 5 INJECTION INTRAVENOUS at 20:46

## 2023-04-27 RX ADMIN — FINASTERIDE 5 MG: 5 TABLET, FILM COATED ORAL at 09:27

## 2023-04-27 RX ADMIN — PANTOPRAZOLE SODIUM 40 MG: 40 TABLET, DELAYED RELEASE ORAL at 09:29

## 2023-04-27 RX ADMIN — CALCIUM CARBONATE 600 MG (1,500 MG)-VITAMIN D3 400 UNIT TABLET 1 TABLET: at 09:26

## 2023-04-27 RX ADMIN — POLYETHYLENE GLYCOL 3350 17 G: 17 POWDER, FOR SOLUTION ORAL at 09:25

## 2023-04-27 RX ADMIN — ATORVASTATIN CALCIUM 10 MG: 10 TABLET, FILM COATED ORAL at 09:27

## 2023-04-27 ASSESSMENT — ENCOUNTER SYMPTOMS
PHOTOPHOBIA: 0
ANAL BLEEDING: 0
CHEST TIGHTNESS: 0
STRIDOR: 0
POLYPHAGIA: 0
CHOKING: 0
SLEEP DISTURBANCE: 0
NAUSEA: 0
COUGH: 0
SORE THROAT: 0
FATIGUE: 0
TROUBLE SWALLOWING: 0
DIZZINESS: 0
EYE REDNESS: 0
EYE PAIN: 0
HALLUCINATIONS: 0
CONSTIPATION: 0
HEADACHES: 0
UNEXPECTED WEIGHT CHANGE: 0
SHORTNESS OF BREATH: 0
ABDOMINAL DISTENTION: 0
WHEEZING: 0
FACIAL SWELLING: 0
SPEECH DIFFICULTY: 0
DIARRHEA: 0
LIGHT-HEADEDNESS: 0
POLYDIPSIA: 0
SINUS PAIN: 0
EYE ITCHING: 0
TREMORS: 0
SINUS PRESSURE: 0
RECTAL PAIN: 0
WEAKNESS: 0
EYE PAIN: 0
FEVER: 0
CONFUSION: 0
COLOR CHANGE: 0
EYE DISCHARGE: 0
VOMITING: 0
NERVOUS/ANXIOUS: 0
SEIZURES: 0
CHILLS: 0
APPETITE CHANGE: 0
ADENOPATHY: 0
NUMBNESS: 0
APNEA: 0
ACTIVITY CHANGE: 0
AGITATION: 0

## 2023-04-27 ASSESSMENT — PAIN SCALES - GENERAL
PAINLEVEL_OUTOF10: 0

## 2023-04-28 LAB
ATRIAL RATE (BPM): 70
FOLATE SERPL-MCNC: >24 NG/ML
MAGNESIUM SERPL-MCNC: 1.9 MG/DL (ref 1.7–2.4)
POTASSIUM SERPL-SCNC: 4.3 MMOL/L (ref 3.4–5.1)
QRS-INTERVAL (MSEC): 152
QT-INTERVAL (MSEC): 414
QTC: 447
R AXIS (DEGREES): 86
REPORT TEXT: NORMAL
T AXIS (DEGREES): -49
TSH SERPL-ACNC: 1.03 MCUNITS/ML (ref 0.35–5)
VENTRICULAR RATE EKG/MIN (BPM): 70
VIT B12 SERPL-MCNC: 1205 PG/ML (ref 211–911)

## 2023-04-28 PROCEDURE — 10004651 HB RX, NO CHARGE ITEM

## 2023-04-28 PROCEDURE — 97530 THERAPEUTIC ACTIVITIES: CPT

## 2023-04-28 PROCEDURE — 97165 OT EVAL LOW COMPLEX 30 MIN: CPT

## 2023-04-28 PROCEDURE — 10004651 HB RX, NO CHARGE ITEM: Performed by: STUDENT IN AN ORGANIZED HEALTH CARE EDUCATION/TRAINING PROGRAM

## 2023-04-28 PROCEDURE — 10002807 HB RX 258: Performed by: INTERNAL MEDICINE

## 2023-04-28 PROCEDURE — 83735 ASSAY OF MAGNESIUM: CPT | Performed by: INTERNAL MEDICINE

## 2023-04-28 PROCEDURE — 84443 ASSAY THYROID STIM HORMONE: CPT | Performed by: INTERNAL MEDICINE

## 2023-04-28 PROCEDURE — 10002800 HB RX 250 W HCPCS

## 2023-04-28 PROCEDURE — 10002803 HB RX 637

## 2023-04-28 PROCEDURE — 97535 SELF CARE MNGMENT TRAINING: CPT

## 2023-04-28 PROCEDURE — 10002803 HB RX 637: Performed by: STUDENT IN AN ORGANIZED HEALTH CARE EDUCATION/TRAINING PROGRAM

## 2023-04-28 PROCEDURE — 97161 PT EVAL LOW COMPLEX 20 MIN: CPT

## 2023-04-28 PROCEDURE — 82607 VITAMIN B-12: CPT | Performed by: INTERNAL MEDICINE

## 2023-04-28 PROCEDURE — 97110 THERAPEUTIC EXERCISES: CPT

## 2023-04-28 PROCEDURE — 36415 COLL VENOUS BLD VENIPUNCTURE: CPT | Performed by: INTERNAL MEDICINE

## 2023-04-28 PROCEDURE — 84132 ASSAY OF SERUM POTASSIUM: CPT | Performed by: INTERNAL MEDICINE

## 2023-04-28 PROCEDURE — 99222 1ST HOSP IP/OBS MODERATE 55: CPT | Performed by: INTERNAL MEDICINE

## 2023-04-28 PROCEDURE — 10000002 HB ROOM CHARGE MED SURG

## 2023-04-28 PROCEDURE — 99233 SBSQ HOSP IP/OBS HIGH 50: CPT | Performed by: INTERNAL MEDICINE

## 2023-04-28 RX ORDER — MAGNESIUM SULFATE 1 G/100ML
1 INJECTION INTRAVENOUS ONCE
Status: COMPLETED | OUTPATIENT
Start: 2023-04-28 | End: 2023-04-28

## 2023-04-28 RX ADMIN — ASPIRIN 81 MG: 81 TABLET, COATED ORAL at 09:03

## 2023-04-28 RX ADMIN — ENOXAPARIN SODIUM 30 MG: 40 INJECTION SUBCUTANEOUS at 09:03

## 2023-04-28 RX ADMIN — METOPROLOL SUCCINATE 25 MG: 25 TABLET, EXTENDED RELEASE ORAL at 09:03

## 2023-04-28 RX ADMIN — MAGNESIUM SULFATE IN DEXTROSE 1 G: 10 INJECTION, SOLUTION INTRAVENOUS at 22:09

## 2023-04-28 RX ADMIN — SACUBITRIL AND VALSARTAN 1 TABLET: 24; 26 TABLET, FILM COATED ORAL at 21:39

## 2023-04-28 RX ADMIN — CALCIUM CARBONATE 600 MG (1,500 MG)-VITAMIN D3 400 UNIT TABLET 1 TABLET: at 09:03

## 2023-04-28 RX ADMIN — ENOXAPARIN SODIUM 30 MG: 40 INJECTION SUBCUTANEOUS at 21:39

## 2023-04-28 RX ADMIN — PANTOPRAZOLE SODIUM 40 MG: 40 TABLET, DELAYED RELEASE ORAL at 06:17

## 2023-04-28 RX ADMIN — DOCUSATE SODIUM 100 MG: 100 CAPSULE, LIQUID FILLED ORAL at 09:03

## 2023-04-28 RX ADMIN — SACUBITRIL AND VALSARTAN 1 TABLET: 24; 26 TABLET, FILM COATED ORAL at 09:03

## 2023-04-28 RX ADMIN — FINASTERIDE 5 MG: 5 TABLET, FILM COATED ORAL at 09:03

## 2023-04-28 RX ADMIN — SODIUM CHLORIDE, PRESERVATIVE FREE 2 ML: 5 INJECTION INTRAVENOUS at 21:41

## 2023-04-28 RX ADMIN — SODIUM CHLORIDE, PRESERVATIVE FREE 2 ML: 5 INJECTION INTRAVENOUS at 09:10

## 2023-04-28 RX ADMIN — POLYETHYLENE GLYCOL 3350 17 G: 17 POWDER, FOR SOLUTION ORAL at 09:04

## 2023-04-28 ASSESSMENT — COGNITIVE AND FUNCTIONAL STATUS - GENERAL
DO YOU HAVE SERIOUS DIFFICULTY WALKING OR CLIMBING STAIRS: YES
BECAUSE OF A PHYSICAL, MENTAL, OR EMOTIONAL CONDITION, DO YOU HAVE SERIOUS DIFFICULTY CONCENTRATING, REMEMBERING OR MAKING DECISIONS: NO
BECAUSE OF A PHYSICAL, MENTAL, OR EMOTIONAL CONDITION, DO YOU HAVE DIFFICULTY DOING ERRANDS ALONE: NO
DAILY_ACTIVITY_RAW_SCORE: 15
HELP NEEDED FOR BATHING: A LOT
HELP NEEDED DRESSING REGULAR LOWER BODY CLOTHING: A LOT
HELP NEEDED FOR PERSONAL GROOMING: A LITTLE
HELP NEEDED FOR TOILETING: A LITTLE
BASIC_MOBILITY_CONVERTED_SCORE: 41.05
DO YOU HAVE DIFFICULTY DRESSING OR BATHING: NO
HELP NEEDED DRESSING REGULAR UPPER BODY CLOTHING: A LOT
BASIC_MOBILITY_RAW_SCORE: 18
DAILY_ACTIVITY_CONVERTED_SCORE: 34.69

## 2023-04-28 ASSESSMENT — ACTIVITIES OF DAILY LIVING (ADL)
PRIOR_ADL: MODIFIED INDEPENDENT
HOME_MANAGEMENT_TIME_ENTRY: 15

## 2023-04-28 ASSESSMENT — PAIN SCALES - GENERAL
PAINLEVEL_OUTOF10: 0

## 2023-04-28 ASSESSMENT — ENCOUNTER SYMPTOMS: PAIN SEVERITY NOW: 0

## 2023-04-29 LAB
ANION GAP SERPL CALC-SCNC: 6 MMOL/L (ref 7–19)
BASOPHILS # BLD: 0 K/MCL (ref 0–0.3)
BASOPHILS NFR BLD: 0 %
BUN SERPL-MCNC: 22 MG/DL (ref 6–20)
BUN/CREAT SERPL: 30 (ref 7–25)
CALCIUM SERPL-MCNC: 8.7 MG/DL (ref 8.4–10.2)
CHLORIDE SERPL-SCNC: 108 MMOL/L (ref 97–110)
CO2 SERPL-SCNC: 28 MMOL/L (ref 21–32)
CREAT SERPL-MCNC: 0.74 MG/DL (ref 0.67–1.17)
DEPRECATED RDW RBC: 48.4 FL (ref 39–50)
EOSINOPHIL # BLD: 0.1 K/MCL (ref 0–0.5)
EOSINOPHIL NFR BLD: 1 %
ERYTHROCYTE [DISTWIDTH] IN BLOOD: 13.3 % (ref 11–15)
FASTING DURATION TIME PATIENT: ABNORMAL H
GFR SERPLBLD BASED ON 1.73 SQ M-ARVRAT: 87 ML/MIN
GLUCOSE SERPL-MCNC: 98 MG/DL (ref 70–99)
HCT VFR BLD CALC: 39.5 % (ref 39–51)
HGB BLD-MCNC: 13 G/DL (ref 13–17)
IMM GRANULOCYTES # BLD AUTO: 0 K/MCL (ref 0–0.2)
IMM GRANULOCYTES # BLD: 1 %
LYMPHOCYTES # BLD: 1.1 K/MCL (ref 1–4)
LYMPHOCYTES NFR BLD: 19 %
MAGNESIUM SERPL-MCNC: 2.1 MG/DL (ref 1.7–2.4)
MCH RBC QN AUTO: 32.7 PG (ref 26–34)
MCHC RBC AUTO-ENTMCNC: 32.9 G/DL (ref 32–36.5)
MCV RBC AUTO: 99.2 FL (ref 78–100)
MONOCYTES # BLD: 0.8 K/MCL (ref 0.3–0.9)
MONOCYTES NFR BLD: 14 %
NEUTROPHILS # BLD: 3.8 K/MCL (ref 1.8–7.7)
NEUTROPHILS NFR BLD: 65 %
NRBC BLD MANUAL-RTO: 0 /100 WBC
PLATELET # BLD AUTO: 180 K/MCL (ref 140–450)
POTASSIUM SERPL-SCNC: 4.4 MMOL/L (ref 3.4–5.1)
RBC # BLD: 3.98 MIL/MCL (ref 4.5–5.9)
SODIUM SERPL-SCNC: 138 MMOL/L (ref 135–145)
WBC # BLD: 5.8 K/MCL (ref 4.2–11)

## 2023-04-29 PROCEDURE — 10002803 HB RX 637

## 2023-04-29 PROCEDURE — 10004651 HB RX, NO CHARGE ITEM

## 2023-04-29 PROCEDURE — 99232 SBSQ HOSP IP/OBS MODERATE 35: CPT | Performed by: NURSE PRACTITIONER

## 2023-04-29 PROCEDURE — 99233 SBSQ HOSP IP/OBS HIGH 50: CPT | Performed by: INTERNAL MEDICINE

## 2023-04-29 PROCEDURE — 85025 COMPLETE CBC W/AUTO DIFF WBC: CPT | Performed by: INTERNAL MEDICINE

## 2023-04-29 PROCEDURE — 10002800 HB RX 250 W HCPCS

## 2023-04-29 PROCEDURE — 36415 COLL VENOUS BLD VENIPUNCTURE: CPT | Performed by: INTERNAL MEDICINE

## 2023-04-29 PROCEDURE — 10004651 HB RX, NO CHARGE ITEM: Performed by: STUDENT IN AN ORGANIZED HEALTH CARE EDUCATION/TRAINING PROGRAM

## 2023-04-29 PROCEDURE — 80048 BASIC METABOLIC PNL TOTAL CA: CPT | Performed by: INTERNAL MEDICINE

## 2023-04-29 PROCEDURE — 10000002 HB ROOM CHARGE MED SURG

## 2023-04-29 PROCEDURE — 83735 ASSAY OF MAGNESIUM: CPT | Performed by: INTERNAL MEDICINE

## 2023-04-29 PROCEDURE — 10002803 HB RX 637: Performed by: NURSE PRACTITIONER

## 2023-04-29 RX ADMIN — SACUBITRIL AND VALSARTAN 1 TABLET: 24; 26 TABLET, FILM COATED ORAL at 08:07

## 2023-04-29 RX ADMIN — ASPIRIN 81 MG: 81 TABLET, COATED ORAL at 08:06

## 2023-04-29 RX ADMIN — SODIUM CHLORIDE, PRESERVATIVE FREE 2 ML: 5 INJECTION INTRAVENOUS at 08:07

## 2023-04-29 RX ADMIN — CALCIUM CARBONATE 600 MG (1,500 MG)-VITAMIN D3 400 UNIT TABLET 1 TABLET: at 08:06

## 2023-04-29 RX ADMIN — FINASTERIDE 5 MG: 5 TABLET, FILM COATED ORAL at 08:06

## 2023-04-29 RX ADMIN — EMPAGLIFLOZIN 10 MG: 10 TABLET, FILM COATED ORAL at 15:42

## 2023-04-29 RX ADMIN — PANTOPRAZOLE SODIUM 40 MG: 40 TABLET, DELAYED RELEASE ORAL at 05:55

## 2023-04-29 RX ADMIN — ENOXAPARIN SODIUM 30 MG: 40 INJECTION SUBCUTANEOUS at 20:23

## 2023-04-29 RX ADMIN — SODIUM CHLORIDE, PRESERVATIVE FREE 2 ML: 5 INJECTION INTRAVENOUS at 20:23

## 2023-04-29 RX ADMIN — METOPROLOL SUCCINATE 25 MG: 25 TABLET, EXTENDED RELEASE ORAL at 08:06

## 2023-04-29 RX ADMIN — ENOXAPARIN SODIUM 30 MG: 40 INJECTION SUBCUTANEOUS at 08:07

## 2023-04-29 RX ADMIN — ACETAMINOPHEN 650 MG: 325 TABLET ORAL at 08:07

## 2023-04-29 RX ADMIN — SACUBITRIL AND VALSARTAN 1 TABLET: 24; 26 TABLET, FILM COATED ORAL at 20:23

## 2023-04-29 ASSESSMENT — PAIN SCALES - GENERAL
PAINLEVEL_OUTOF10: 4
PAINLEVEL_OUTOF10: 0

## 2023-04-30 LAB
ANION GAP SERPL CALC-SCNC: 6 MMOL/L (ref 7–19)
BASOPHILS # BLD: 0 K/MCL (ref 0–0.3)
BASOPHILS NFR BLD: 1 %
BUN SERPL-MCNC: 29 MG/DL (ref 6–20)
BUN/CREAT SERPL: 30 (ref 7–25)
CALCIUM SERPL-MCNC: 8.8 MG/DL (ref 8.4–10.2)
CHLORIDE SERPL-SCNC: 104 MMOL/L (ref 97–110)
CO2 SERPL-SCNC: 32 MMOL/L (ref 21–32)
CREAT SERPL-MCNC: 0.96 MG/DL (ref 0.67–1.17)
DEPRECATED RDW RBC: 47.3 FL (ref 39–50)
EOSINOPHIL # BLD: 0.1 K/MCL (ref 0–0.5)
EOSINOPHIL NFR BLD: 1 %
ERYTHROCYTE [DISTWIDTH] IN BLOOD: 13.2 % (ref 11–15)
FASTING DURATION TIME PATIENT: ABNORMAL H
GFR SERPLBLD BASED ON 1.73 SQ M-ARVRAT: 76 ML/MIN
GLUCOSE SERPL-MCNC: 100 MG/DL (ref 70–99)
HCT VFR BLD CALC: 38.5 % (ref 39–51)
HGB BLD-MCNC: 12.8 G/DL (ref 13–17)
IMM GRANULOCYTES # BLD AUTO: 0 K/MCL (ref 0–0.2)
IMM GRANULOCYTES # BLD: 1 %
LYMPHOCYTES # BLD: 1.1 K/MCL (ref 1–4)
LYMPHOCYTES NFR BLD: 17 %
MAGNESIUM SERPL-MCNC: 2.2 MG/DL (ref 1.7–2.4)
MCH RBC QN AUTO: 32.4 PG (ref 26–34)
MCHC RBC AUTO-ENTMCNC: 33.2 G/DL (ref 32–36.5)
MCV RBC AUTO: 97.5 FL (ref 78–100)
MONOCYTES # BLD: 0.9 K/MCL (ref 0.3–0.9)
MONOCYTES NFR BLD: 13 %
NEUTROPHILS # BLD: 4.4 K/MCL (ref 1.8–7.7)
NEUTROPHILS NFR BLD: 67 %
NRBC BLD MANUAL-RTO: 0 /100 WBC
PLATELET # BLD AUTO: 199 K/MCL (ref 140–450)
POTASSIUM SERPL-SCNC: 4.3 MMOL/L (ref 3.4–5.1)
RBC # BLD: 3.95 MIL/MCL (ref 4.5–5.9)
SODIUM SERPL-SCNC: 138 MMOL/L (ref 135–145)
WBC # BLD: 6.5 K/MCL (ref 4.2–11)

## 2023-04-30 PROCEDURE — 10002800 HB RX 250 W HCPCS

## 2023-04-30 PROCEDURE — 99233 SBSQ HOSP IP/OBS HIGH 50: CPT | Performed by: NURSE PRACTITIONER

## 2023-04-30 PROCEDURE — 10004651 HB RX, NO CHARGE ITEM: Performed by: STUDENT IN AN ORGANIZED HEALTH CARE EDUCATION/TRAINING PROGRAM

## 2023-04-30 PROCEDURE — 80048 BASIC METABOLIC PNL TOTAL CA: CPT | Performed by: INTERNAL MEDICINE

## 2023-04-30 PROCEDURE — 36415 COLL VENOUS BLD VENIPUNCTURE: CPT | Performed by: INTERNAL MEDICINE

## 2023-04-30 PROCEDURE — 10002803 HB RX 637: Performed by: NURSE PRACTITIONER

## 2023-04-30 PROCEDURE — 83735 ASSAY OF MAGNESIUM: CPT | Performed by: INTERNAL MEDICINE

## 2023-04-30 PROCEDURE — 10004651 HB RX, NO CHARGE ITEM

## 2023-04-30 PROCEDURE — 10002803 HB RX 637

## 2023-04-30 PROCEDURE — 85025 COMPLETE CBC W/AUTO DIFF WBC: CPT | Performed by: INTERNAL MEDICINE

## 2023-04-30 PROCEDURE — 10000002 HB ROOM CHARGE MED SURG

## 2023-04-30 PROCEDURE — 10002807 HB RX 258: Performed by: INTERNAL MEDICINE

## 2023-04-30 PROCEDURE — 99233 SBSQ HOSP IP/OBS HIGH 50: CPT | Performed by: INTERNAL MEDICINE

## 2023-04-30 RX ORDER — SODIUM CHLORIDE 9 MG/ML
INJECTION, SOLUTION INTRAVENOUS CONTINUOUS
Status: DISPENSED | OUTPATIENT
Start: 2023-04-30 | End: 2023-04-30

## 2023-04-30 RX ADMIN — METOPROLOL SUCCINATE 25 MG: 25 TABLET, EXTENDED RELEASE ORAL at 09:03

## 2023-04-30 RX ADMIN — SACUBITRIL AND VALSARTAN 1 TABLET: 24; 26 TABLET, FILM COATED ORAL at 20:22

## 2023-04-30 RX ADMIN — ENOXAPARIN SODIUM 30 MG: 40 INJECTION SUBCUTANEOUS at 20:22

## 2023-04-30 RX ADMIN — SODIUM CHLORIDE, PRESERVATIVE FREE 2 ML: 5 INJECTION INTRAVENOUS at 09:04

## 2023-04-30 RX ADMIN — EMPAGLIFLOZIN 10 MG: 10 TABLET, FILM COATED ORAL at 09:03

## 2023-04-30 RX ADMIN — PANTOPRAZOLE SODIUM 40 MG: 40 TABLET, DELAYED RELEASE ORAL at 06:20

## 2023-04-30 RX ADMIN — ENOXAPARIN SODIUM 30 MG: 40 INJECTION SUBCUTANEOUS at 09:02

## 2023-04-30 RX ADMIN — ATORVASTATIN CALCIUM 10 MG: 10 TABLET, FILM COATED ORAL at 09:04

## 2023-04-30 RX ADMIN — SACUBITRIL AND VALSARTAN 1 TABLET: 24; 26 TABLET, FILM COATED ORAL at 09:03

## 2023-04-30 RX ADMIN — SODIUM CHLORIDE 500 ML: 9 INJECTION, SOLUTION INTRAVENOUS at 13:53

## 2023-04-30 RX ADMIN — FINASTERIDE 5 MG: 5 TABLET, FILM COATED ORAL at 09:04

## 2023-04-30 RX ADMIN — SODIUM CHLORIDE: 9 INJECTION, SOLUTION INTRAVENOUS at 16:08

## 2023-04-30 RX ADMIN — ACETAMINOPHEN 650 MG: 325 TABLET ORAL at 02:09

## 2023-04-30 RX ADMIN — CALCIUM CARBONATE 600 MG (1,500 MG)-VITAMIN D3 400 UNIT TABLET 1 TABLET: at 09:03

## 2023-04-30 RX ADMIN — ASPIRIN 81 MG: 81 TABLET, COATED ORAL at 09:03

## 2023-04-30 ASSESSMENT — PAIN SCALES - GENERAL
PAINLEVEL_OUTOF10: 0
PAINLEVEL_OUTOF10: 0
PAINLEVEL_OUTOF10: 1
PAINLEVEL_OUTOF10: 0
PAINLEVEL_OUTOF10: 3

## 2023-04-30 ASSESSMENT — ENCOUNTER SYMPTOMS
SHORTNESS OF BREATH: 0
ABDOMINAL PAIN: 0
FATIGUE: 0
ABDOMINAL DISTENTION: 0
DIZZINESS: 0
LIGHT-HEADEDNESS: 0
WHEEZING: 0

## 2023-05-01 LAB
CREAT SERPL-MCNC: 0.77 MG/DL (ref 0.67–1.17)
GFR SERPLBLD BASED ON 1.73 SQ M-ARVRAT: 86 ML/MIN
POTASSIUM SERPL-SCNC: 4.2 MMOL/L (ref 3.4–5.1)

## 2023-05-01 PROCEDURE — 10004651 HB RX, NO CHARGE ITEM

## 2023-05-01 PROCEDURE — 99233 SBSQ HOSP IP/OBS HIGH 50: CPT | Performed by: INTERNAL MEDICINE

## 2023-05-01 PROCEDURE — 99221 1ST HOSP IP/OBS SF/LOW 40: CPT | Performed by: PHYSICAL MEDICINE & REHABILITATION

## 2023-05-01 PROCEDURE — 10000002 HB ROOM CHARGE MED SURG

## 2023-05-01 PROCEDURE — 10004651 HB RX, NO CHARGE ITEM: Performed by: STUDENT IN AN ORGANIZED HEALTH CARE EDUCATION/TRAINING PROGRAM

## 2023-05-01 PROCEDURE — 84132 ASSAY OF SERUM POTASSIUM: CPT | Performed by: INTERNAL MEDICINE

## 2023-05-01 PROCEDURE — 10002800 HB RX 250 W HCPCS

## 2023-05-01 PROCEDURE — 82565 ASSAY OF CREATININE: CPT | Performed by: INTERNAL MEDICINE

## 2023-05-01 PROCEDURE — 10002803 HB RX 637

## 2023-05-01 PROCEDURE — 36415 COLL VENOUS BLD VENIPUNCTURE: CPT | Performed by: INTERNAL MEDICINE

## 2023-05-01 RX ADMIN — SACUBITRIL AND VALSARTAN 1 TABLET: 24; 26 TABLET, FILM COATED ORAL at 09:10

## 2023-05-01 RX ADMIN — METOPROLOL SUCCINATE 25 MG: 25 TABLET, EXTENDED RELEASE ORAL at 09:10

## 2023-05-01 RX ADMIN — ASPIRIN 81 MG: 81 TABLET, COATED ORAL at 09:10

## 2023-05-01 RX ADMIN — ENOXAPARIN SODIUM 30 MG: 40 INJECTION SUBCUTANEOUS at 09:10

## 2023-05-01 RX ADMIN — ENOXAPARIN SODIUM 30 MG: 40 INJECTION SUBCUTANEOUS at 21:52

## 2023-05-01 RX ADMIN — PANTOPRAZOLE SODIUM 40 MG: 40 TABLET, DELAYED RELEASE ORAL at 05:07

## 2023-05-01 RX ADMIN — SACUBITRIL AND VALSARTAN 1 TABLET: 24; 26 TABLET, FILM COATED ORAL at 21:51

## 2023-05-01 RX ADMIN — CALCIUM CARBONATE 600 MG (1,500 MG)-VITAMIN D3 400 UNIT TABLET 1 TABLET: at 09:10

## 2023-05-01 RX ADMIN — SODIUM CHLORIDE, PRESERVATIVE FREE 2 ML: 5 INJECTION INTRAVENOUS at 21:53

## 2023-05-01 RX ADMIN — SODIUM CHLORIDE, PRESERVATIVE FREE 2 ML: 5 INJECTION INTRAVENOUS at 09:10

## 2023-05-01 RX ADMIN — FINASTERIDE 5 MG: 5 TABLET, FILM COATED ORAL at 09:10

## 2023-05-01 ASSESSMENT — ENCOUNTER SYMPTOMS
CHILLS: 0
CONFUSION: 0
NUMBNESS: 0
ABDOMINAL PAIN: 0
DIZZINESS: 0
WEAKNESS: 1
HEADACHES: 0
SHORTNESS OF BREATH: 0
NAUSEA: 0
FATIGUE: 0
FEVER: 0
TROUBLE SWALLOWING: 0

## 2023-05-01 ASSESSMENT — PAIN SCALES - GENERAL
PAINLEVEL_OUTOF10: 0

## 2023-05-02 VITALS
OXYGEN SATURATION: 98 % | DIASTOLIC BLOOD PRESSURE: 75 MMHG | RESPIRATION RATE: 16 BRPM | HEIGHT: 69 IN | HEART RATE: 70 BPM | BODY MASS INDEX: 20.93 KG/M2 | TEMPERATURE: 97.5 F | SYSTOLIC BLOOD PRESSURE: 124 MMHG | WEIGHT: 141.31 LBS

## 2023-05-02 PROCEDURE — 10002803 HB RX 637

## 2023-05-02 PROCEDURE — 99239 HOSP IP/OBS DSCHRG MGMT >30: CPT | Performed by: STUDENT IN AN ORGANIZED HEALTH CARE EDUCATION/TRAINING PROGRAM

## 2023-05-02 PROCEDURE — 10004651 HB RX, NO CHARGE ITEM: Performed by: STUDENT IN AN ORGANIZED HEALTH CARE EDUCATION/TRAINING PROGRAM

## 2023-05-02 PROCEDURE — 10002800 HB RX 250 W HCPCS

## 2023-05-02 RX ORDER — ACETAMINOPHEN 325 MG/1
650 TABLET ORAL EVERY 6 HOURS PRN
Status: SHIPPED | COMMUNITY
Start: 2023-05-02

## 2023-05-02 RX ADMIN — SODIUM CHLORIDE, PRESERVATIVE FREE 2 ML: 5 INJECTION INTRAVENOUS at 09:22

## 2023-05-02 RX ADMIN — PANTOPRAZOLE SODIUM 40 MG: 40 TABLET, DELAYED RELEASE ORAL at 07:00

## 2023-05-02 RX ADMIN — SACUBITRIL AND VALSARTAN 1 TABLET: 24; 26 TABLET, FILM COATED ORAL at 09:22

## 2023-05-02 RX ADMIN — ENOXAPARIN SODIUM 30 MG: 40 INJECTION SUBCUTANEOUS at 09:22

## 2023-05-02 RX ADMIN — FINASTERIDE 5 MG: 5 TABLET, FILM COATED ORAL at 09:22

## 2023-05-02 RX ADMIN — CALCIUM CARBONATE 600 MG (1,500 MG)-VITAMIN D3 400 UNIT TABLET 1 TABLET: at 09:22

## 2023-05-02 ASSESSMENT — PAIN SCALES - GENERAL: PAINLEVEL_OUTOF10: 0

## 2023-06-23 ENCOUNTER — LAB ENCOUNTER (OUTPATIENT)
Dept: LAB | Facility: HOSPITAL | Age: 88
End: 2023-06-23
Attending: NURSE PRACTITIONER
Payer: MEDICARE

## 2023-06-23 DIAGNOSIS — I50.22 CHRONIC SYSTOLIC HEART FAILURE (HCC): Primary | ICD-10-CM

## 2023-06-23 DIAGNOSIS — I10 ESSENTIAL HYPERTENSION, MALIGNANT: ICD-10-CM

## 2023-06-23 LAB
ANION GAP SERPL CALC-SCNC: <0 MMOL/L (ref 0–18)
BASOPHILS # BLD AUTO: 0.02 X10(3) UL (ref 0–0.2)
BASOPHILS NFR BLD AUTO: 0.3 %
BUN BLD-MCNC: 19 MG/DL (ref 7–18)
CALCIUM BLD-MCNC: 9.1 MG/DL (ref 8.5–10.1)
CHLORIDE SERPL-SCNC: 107 MMOL/L (ref 98–112)
CO2 SERPL-SCNC: 31 MMOL/L (ref 21–32)
CREAT BLD-MCNC: 0.75 MG/DL
DEPRECATED HBV CORE AB SER IA-ACNC: 62.5 NG/ML
EOSINOPHIL # BLD AUTO: 0.02 X10(3) UL (ref 0–0.7)
EOSINOPHIL NFR BLD AUTO: 0.3 %
ERYTHROCYTE [DISTWIDTH] IN BLOOD BY AUTOMATED COUNT: 13.7 %
FASTING STATUS PATIENT QL REPORTED: NO
GFR SERPLBLD BASED ON 1.73 SQ M-ARVRAT: 86 ML/MIN/1.73M2 (ref 60–?)
GLUCOSE BLD-MCNC: 101 MG/DL (ref 70–99)
HCT VFR BLD AUTO: 39.6 %
HGB BLD-MCNC: 12.7 G/DL
IMM GRANULOCYTES # BLD AUTO: 0.03 X10(3) UL (ref 0–1)
IMM GRANULOCYTES NFR BLD: 0.4 %
IRON SATN MFR SERPL: 11 %
IRON SERPL-MCNC: 33 UG/DL
LYMPHOCYTES # BLD AUTO: 0.73 X10(3) UL (ref 1–4)
LYMPHOCYTES NFR BLD AUTO: 10.1 %
MCH RBC QN AUTO: 32.6 PG (ref 26–34)
MCHC RBC AUTO-ENTMCNC: 32.1 G/DL (ref 31–37)
MCV RBC AUTO: 101.8 FL
MONOCYTES # BLD AUTO: 0.79 X10(3) UL (ref 0.1–1)
MONOCYTES NFR BLD AUTO: 10.9 %
NEUTROPHILS # BLD AUTO: 5.67 X10 (3) UL (ref 1.5–7.7)
NEUTROPHILS # BLD AUTO: 5.67 X10(3) UL (ref 1.5–7.7)
NEUTROPHILS NFR BLD AUTO: 78 %
NT-PROBNP SERPL-MCNC: 1535 PG/ML (ref ?–450)
OSMOLALITY SERPL CALC.SUM OF ELEC: 284 MOSM/KG (ref 275–295)
PLATELET # BLD AUTO: 193 10(3)UL (ref 150–450)
POTASSIUM SERPL-SCNC: 4.5 MMOL/L (ref 3.5–5.1)
RBC # BLD AUTO: 3.89 X10(6)UL
SODIUM SERPL-SCNC: 136 MMOL/L (ref 136–145)
TIBC SERPL-MCNC: 292 UG/DL (ref 240–450)
TRANSFERRIN SERPL-MCNC: 196 MG/DL (ref 200–360)
WBC # BLD AUTO: 7.3 X10(3) UL (ref 4–11)

## 2023-06-23 PROCEDURE — 82728 ASSAY OF FERRITIN: CPT

## 2023-06-23 PROCEDURE — 83550 IRON BINDING TEST: CPT

## 2023-06-23 PROCEDURE — 85025 COMPLETE CBC W/AUTO DIFF WBC: CPT

## 2023-06-23 PROCEDURE — 83880 ASSAY OF NATRIURETIC PEPTIDE: CPT

## 2023-06-23 PROCEDURE — 83540 ASSAY OF IRON: CPT

## 2023-06-23 PROCEDURE — 36415 COLL VENOUS BLD VENIPUNCTURE: CPT

## 2023-06-23 PROCEDURE — 80048 BASIC METABOLIC PNL TOTAL CA: CPT

## 2023-06-26 ENCOUNTER — TELEPHONE (OUTPATIENT)
Dept: CARDIOLOGY CLINIC | Facility: HOSPITAL | Age: 88
End: 2023-06-26

## 2023-06-28 ENCOUNTER — TELEPHONE (OUTPATIENT)
Dept: CARDIOLOGY CLINIC | Facility: HOSPITAL | Age: 88
End: 2023-06-28

## 2023-06-29 ENCOUNTER — TELEPHONE (OUTPATIENT)
Dept: CARDIOLOGY CLINIC | Facility: HOSPITAL | Age: 88
End: 2023-06-29

## 2023-07-19 ENCOUNTER — TELEPHONE (OUTPATIENT)
Dept: CARDIOLOGY CLINIC | Facility: HOSPITAL | Age: 88
End: 2023-07-19

## 2023-07-21 ENCOUNTER — TELEPHONE (OUTPATIENT)
Dept: CARDIOLOGY CLINIC | Facility: HOSPITAL | Age: 88
End: 2023-07-21

## 2023-07-21 NOTE — TELEPHONE ENCOUNTER
Called pt today fto schedule a [facility] appointment post hospital discharge. First attempt to reach patient. Unable to leave a message to call the [facility] as the phone continued to ring and ring. Added pt.  To the callback list.

## 2023-07-27 ENCOUNTER — TELEPHONE (OUTPATIENT)
Dept: CARDIOLOGY CLINIC | Facility: HOSPITAL | Age: 88
End: 2023-07-27

## 2023-07-27 NOTE — TELEPHONE ENCOUNTER
Called patient today on his home and mobile number to schedule him for his 3 doses of IV venofer, as ordered by Joyce Hernandez with [facility]. 2nd attempt to reach the patient, but both phones rang with no voicemail option. Added patient to the Callback List for next week.

## 2023-08-04 ENCOUNTER — TELEPHONE (OUTPATIENT)
Dept: CARDIOLOGY CLINIC | Facility: HOSPITAL | Age: 88
End: 2023-08-04

## (undated) DEVICE — 3M™ RED DOT™ MONITORING ELECTRODE WITH FOAM TAPE AND STICKY GEL, 50/BAG, 20/CASE, 72/PLT 2570: Brand: RED DOT™

## (undated) DEVICE — 1200CC GUARDIAN II: Brand: GUARDIAN

## (undated) DEVICE — ENDOSCOPY PACK UPPER: Brand: MEDLINE INDUSTRIES, INC.

## (undated) DEVICE — FILTERLINE NASAL ADULT O2/CO2

## (undated) DEVICE — Device: Brand: DEFENDO AIR/WATER/SUCTION AND BIOPSY VALVE

## (undated) DEVICE — FORCEP BIOPSY RJ4 LG CAP W/ND

## (undated) NOTE — Clinical Note
Jorge Sandoval's (Luis's father) is having some cough. I think he needs a repeat EGD.  I am also getting a CT of the chest. He will call to arrange an appointment for the EGD. - Mayra Arteaga

## (undated) NOTE — MR AVS SNAPSHOT
After Visit Summary   5/18/2017    Janina Wu    MRN: [id number]           Diagnoses this Visit     Malignant neoplasm of lower third of esophagus ([facility] [address]    -  Primary       Allergies     No Known Allergies      Your Vital Signs Were     BP Puls CBC W/ DIFFERENTIAL[[id number]]          Abnormal            Final result                 Please view results for these tests on the individual orders.          Result Summary for COMP METABOLIC PANEL (14)      Component Results     Component Value Flag Ref Lymphocyte Absolute 0.88 (L) 0.90-4.00  x10(3) uL Final    Monocyte Absolute 0.64 (H) 0.10-0.60  x10(3) uL Final    Eosinophil Absolute 0.03  0.00-0.30  x10(3) uL Final    Basophil Absolute 0.03  0.00-0.10  x10(3) uL Final    Immature Granulocyte Absolute